# Patient Record
Sex: FEMALE | Race: BLACK OR AFRICAN AMERICAN | NOT HISPANIC OR LATINO
[De-identification: names, ages, dates, MRNs, and addresses within clinical notes are randomized per-mention and may not be internally consistent; named-entity substitution may affect disease eponyms.]

---

## 2019-04-23 ENCOUNTER — APPOINTMENT (OUTPATIENT)
Dept: SURGERY | Facility: CLINIC | Age: 54
End: 2019-04-23
Payer: MEDICAID

## 2019-04-23 VITALS
BODY MASS INDEX: 44.6 KG/M2 | OXYGEN SATURATION: 97 % | HEART RATE: 72 BPM | WEIGHT: 277.5 LBS | HEIGHT: 66 IN | TEMPERATURE: 97.3 F | SYSTOLIC BLOOD PRESSURE: 135 MMHG | DIASTOLIC BLOOD PRESSURE: 79 MMHG

## 2019-04-23 DIAGNOSIS — Z00.00 ENCOUNTER FOR GENERAL ADULT MEDICAL EXAMINATION W/OUT ABNORMAL FINDINGS: ICD-10-CM

## 2019-04-23 PROCEDURE — 99203 OFFICE O/P NEW LOW 30 MIN: CPT

## 2019-04-23 NOTE — PLAN
[FreeTextEntry1] : Pt to begin workup process for bariatric surgery. Pt expresses interest in the VSG

## 2019-04-23 NOTE — HISTORY OF PRESENT ILLNESS
[de-identified] : Pt is a 54 y/o F with pmhx of HLD, HTN, b/l knee pain, who presents today feeling well. Pt states that she has struggled with her weight since quitting smoking in 2011. Pt notes that 2 years ago, she fell at work injuring both knees which has played a role in her weight gain. Reports trying and failing multiple diet and exercise regimens without success. Reports that 11/2018, she had L knee meniscal repair surgery. Pt states she may have a R knee replacement in the future, pending the improvement of her pain with wt loss surgery. Pt is not currently working at this time, pt lives alone at home. Reports b/l ankle swelling. Expresses interest in the VSG, denies reflux. \par

## 2019-04-24 ENCOUNTER — OTHER (OUTPATIENT)
Age: 54
End: 2019-04-24

## 2019-05-13 ENCOUNTER — OTHER (OUTPATIENT)
Age: 54
End: 2019-05-13

## 2019-05-21 ENCOUNTER — APPOINTMENT (OUTPATIENT)
Age: 54
End: 2019-05-21

## 2019-05-21 VITALS — HEIGHT: 66 IN | BODY MASS INDEX: 45.87 KG/M2 | WEIGHT: 285.38 LBS

## 2019-05-22 DIAGNOSIS — Z86.2 PERSONAL HISTORY OF DISEASES OF THE BLOOD AND BLOOD-FORMING ORGANS AND CERTAIN DISORDERS INVOLVING THE IMMUNE MECHANISM: ICD-10-CM

## 2019-05-22 RX ORDER — IRON PS COMPLEX/B12/FOLIC ACID 150-25-1
150-25-1 CAPSULE ORAL
Qty: 30 | Refills: 0 | Status: ACTIVE | COMMUNITY
Start: 2019-05-22 | End: 1900-01-01

## 2019-05-22 RX ORDER — OMEPRAZOLE 20 MG/1
20 CAPSULE, DELAYED RELEASE ORAL DAILY
Qty: 30 | Refills: 1 | Status: ACTIVE | COMMUNITY
Start: 2019-05-22 | End: 1900-01-01

## 2019-05-30 ENCOUNTER — OTHER (OUTPATIENT)
Age: 54
End: 2019-05-30

## 2019-06-19 LAB
25(OH)D3 SERPL-MCNC: 19.5 NG/ML
A-TOCOPHEROL VIT E SERPL-MCNC: 10.6 MG/L
ALBUMIN SERPL ELPH-MCNC: 4 G/DL
ALP BLD-CCNC: 101 U/L
ALT SERPL-CCNC: 15 U/L
ANION GAP SERPL CALC-SCNC: 11 MMOL/L
APPEARANCE: CLEAR
AST SERPL-CCNC: 12 U/L
BACTERIA: NEGATIVE
BASOPHILS # BLD AUTO: 0.06 K/UL
BASOPHILS NFR BLD AUTO: 0.9 %
BETA+GAMMA TOCOPHEROL SERPL-MCNC: 1.9 MG/L
BILIRUB SERPL-MCNC: 0.6 MG/DL
BILIRUBIN URINE: NEGATIVE
BLOOD URINE: NEGATIVE
BUN SERPL-MCNC: 13 MG/DL
CA-I SERPL-SCNC: 1.24 MMOL/L
CALCIUM SERPL-MCNC: 9.4 MG/DL
CALCIUM SERPL-MCNC: 9.4 MG/DL
CHLORIDE SERPL-SCNC: 106 MMOL/L
CHOLEST SERPL-MCNC: 163 MG/DL
CHOLEST/HDLC SERPL: 2.9 RATIO
CO2 SERPL-SCNC: 26 MMOL/L
COLOR: YELLOW
CREAT SERPL-MCNC: 0.7 MG/DL
EOSINOPHIL # BLD AUTO: 0.13 K/UL
EOSINOPHIL NFR BLD AUTO: 2 %
ESTIMATED AVERAGE GLUCOSE: 123 MG/DL
FOLATE SERPL-MCNC: 8 NG/ML
GLUCOSE QUALITATIVE U: NEGATIVE
GLUCOSE SERPL-MCNC: 91 MG/DL
HBA1C MFR BLD HPLC: 5.9 %
HCG SERPL QL: NEGATIVE
HCT VFR BLD CALC: 37.3 %
HDLC SERPL-MCNC: 56 MG/DL
HGB BLD-MCNC: 11.3 G/DL
HYALINE CASTS: 1 /LPF
IMM GRANULOCYTES NFR BLD AUTO: 0.3 %
INR PPP: 1.02 RATIO
IRON SATN MFR SERPL: 10 %
IRON SERPL-MCNC: 30 UG/DL
KETONES URINE: NEGATIVE
LDLC SERPL CALC-MCNC: 90 MG/DL
LEUKOCYTE ESTERASE URINE: NEGATIVE
LYMPHOCYTES # BLD AUTO: 2.02 K/UL
LYMPHOCYTES NFR BLD AUTO: 30.6 %
MAN DIFF?: NORMAL
MCHC RBC-ENTMCNC: 22.4 PG
MCHC RBC-ENTMCNC: 30.3 GM/DL
MCV RBC AUTO: 74 FL
MICROSCOPIC-UA: NORMAL
MONOCYTES # BLD AUTO: 0.59 K/UL
MONOCYTES NFR BLD AUTO: 8.9 %
NEUTROPHILS # BLD AUTO: 3.78 K/UL
NEUTROPHILS NFR BLD AUTO: 57.3 %
NITRITE URINE: NEGATIVE
PAPP-A SERPL-ACNC: 1 MIU/ML
PARATHYROID HORMONE INTACT: 71 PG/ML
PH URINE: 6.5
PLATELET # BLD AUTO: 378 K/UL
POTASSIUM SERPL-SCNC: 4 MMOL/L
PREALB SERPL NEPH-MCNC: 22 MG/DL
PROT SERPL-MCNC: 7.2 G/DL
PROTEIN URINE: NORMAL
PT BLD: 11.5 SEC
RBC # BLD: 5.04 M/UL
RBC # FLD: 17.8 %
RED BLOOD CELLS URINE: 3 /HPF
SODIUM SERPL-SCNC: 143 MMOL/L
SPECIFIC GRAVITY URINE: 1.02
SQUAMOUS EPITHELIAL CELLS: 1 /HPF
TIBC SERPL-MCNC: 296 UG/DL
TRIGL SERPL-MCNC: 86 MG/DL
TSH SERPL-ACNC: 3.97 UIU/ML
UIBC SERPL-MCNC: 266 UG/DL
UREA BREATH TEST QL: POSITIVE
UROBILINOGEN URINE: NORMAL
VIT A SERPL-MCNC: 35.7 UG/DL
VIT B1 SERPL-MCNC: 100.6 NMOL/L
VIT B12 SERPL-MCNC: 371 PG/ML
WBC # FLD AUTO: 6.6 K/UL
WHITE BLOOD CELLS URINE: 1 /HPF
ZINC SERPL-MCNC: 94 UG/DL

## 2019-06-24 ENCOUNTER — APPOINTMENT (OUTPATIENT)
Age: 54
End: 2019-06-24
Payer: MEDICAID

## 2019-06-24 ENCOUNTER — OTHER (OUTPATIENT)
Age: 54
End: 2019-06-24

## 2019-06-24 VITALS — HEIGHT: 66 IN | BODY MASS INDEX: 45.16 KG/M2 | WEIGHT: 281 LBS

## 2019-06-24 PROCEDURE — 97802 MEDICAL NUTRITION INDIV IN: CPT

## 2019-06-26 ENCOUNTER — APPOINTMENT (OUTPATIENT)
Dept: ORTHOPEDIC SURGERY | Facility: CLINIC | Age: 54
End: 2019-06-26
Payer: OTHER MISCELLANEOUS

## 2019-06-26 VITALS — HEIGHT: 66 IN | WEIGHT: 281 LBS | BODY MASS INDEX: 45.16 KG/M2

## 2019-06-26 PROCEDURE — 73562 X-RAY EXAM OF KNEE 3: CPT | Mod: 50

## 2019-06-26 PROCEDURE — 20611 DRAIN/INJ JOINT/BURSA W/US: CPT | Mod: 50

## 2019-06-26 PROCEDURE — 99204 OFFICE O/P NEW MOD 45 MIN: CPT | Mod: 25

## 2019-06-26 NOTE — HISTORY OF PRESENT ILLNESS
[de-identified] : HERE FOR FOLLOW UP AND PERMANENCY REPORT - PATIENT WAS PENDING APPROVAL FOR RIGHT KNEE REPLACEMENT. HAS BEEN CONSULTING WITH DR. WALTON ON BARIATRIC SURGERY AS WELLPatient appeared to move forward with right knee replacement surgery. Both knees are painful but the right is moreAdvanced

## 2019-06-26 NOTE — PROCEDURE
[de-identified] : He was given cortisone injections into the lateral compartment of each knee. This is done sterile conditions an ultrasound guidance. Patient tolerated the procedure well.

## 2019-06-26 NOTE — PHYSICAL EXAM
[de-identified] : Right knee has medial joint line tenderness range of motion 0-110°. Neurovascular intact distally.\par \par Left knee has medial joint line tenderness there is warmth range of motion 0-120°. Neurovascular intact distally. [de-identified] : Patient had AP standing individual lateral and sunrise views obtained of both knees. Both knees have complete obliteration of medial joint space on standing AP projection right is radiographically worse.

## 2019-06-26 NOTE — DISCUSSION/SUMMARY
[de-identified] : Reverse benefits of knee replacement surgery were discussed in detail with the patient she asked appropriate questions are answered to her satisfaction. [Surgical risks reviewed] : Surgical risks reviewed

## 2019-07-22 ENCOUNTER — APPOINTMENT (OUTPATIENT)
Dept: BARIATRICS | Facility: CLINIC | Age: 54
End: 2019-07-22
Payer: MEDICAID

## 2019-07-22 VITALS — WEIGHT: 282.13 LBS | HEIGHT: 66 IN | BODY MASS INDEX: 45.34 KG/M2

## 2019-07-22 PROCEDURE — 97803 MED NUTRITION INDIV SUBSEQ: CPT

## 2019-07-23 ENCOUNTER — OTHER (OUTPATIENT)
Age: 54
End: 2019-07-23

## 2019-08-14 ENCOUNTER — APPOINTMENT (OUTPATIENT)
Dept: ORTHOPEDIC SURGERY | Facility: CLINIC | Age: 54
End: 2019-08-14
Payer: OTHER MISCELLANEOUS

## 2019-08-14 VITALS — BODY MASS INDEX: 45.32 KG/M2 | WEIGHT: 282 LBS | HEIGHT: 66 IN

## 2019-08-14 PROCEDURE — 20611 DRAIN/INJ JOINT/BURSA W/US: CPT | Mod: 50

## 2019-08-14 PROCEDURE — 99214 OFFICE O/P EST MOD 30 MIN: CPT | Mod: 25

## 2019-08-14 NOTE — DISCUSSION/SUMMARY
[Surgical risks reviewed] : Surgical risks reviewed [de-identified] : The resources benefits of knee replacement surgery discussed in detail. Patient asked appropriate questions which were answered to her satisfaction. We will try to accommodate her pending authorization.

## 2019-08-14 NOTE — PHYSICAL EXAM
[de-identified] : Right knee has medial joint line tenderness range of motion 0-110°. Neurovascular intact distally.\par \par Left knee has medial joint line tenderness there is warmth range of motion 0-120°. Neurovascular intact distally.

## 2019-08-14 NOTE — HISTORY OF PRESENT ILLNESS
[de-identified] : SARA KNEE PAIN - WOULD LIKE CORTISONE INJECTIONS. \par \par PENDING RIGHT KNEE REPLACEMENT APPROVAL THROUGH . Patient complains of worsening bilateral knee pain right greater than left. She currently takes anti-inflammatories and utilizes a cane.

## 2019-08-14 NOTE — PROCEDURE
[de-identified] : He was given cortisone injections into the lateral compartment of each knee. This is done sterile conditions an ultrasound guidance. Patient tolerated the procedure well.

## 2019-08-26 ENCOUNTER — APPOINTMENT (OUTPATIENT)
Dept: BARIATRICS | Facility: CLINIC | Age: 54
End: 2019-08-26
Payer: MEDICAID

## 2019-08-26 VITALS — HEIGHT: 66 IN | BODY MASS INDEX: 44.86 KG/M2 | WEIGHT: 279.13 LBS

## 2019-08-26 PROCEDURE — 97803 MED NUTRITION INDIV SUBSEQ: CPT

## 2019-09-24 ENCOUNTER — APPOINTMENT (OUTPATIENT)
Dept: SURGERY | Facility: CLINIC | Age: 54
End: 2019-09-24
Payer: MEDICAID

## 2019-09-24 VITALS
TEMPERATURE: 96.9 F | HEIGHT: 66 IN | OXYGEN SATURATION: 96 % | HEART RATE: 96 BPM | BODY MASS INDEX: 44.54 KG/M2 | SYSTOLIC BLOOD PRESSURE: 123 MMHG | DIASTOLIC BLOOD PRESSURE: 71 MMHG | WEIGHT: 277.13 LBS

## 2019-09-24 DIAGNOSIS — A04.8 OTHER SPECIFIED BACTERIAL INTESTINAL INFECTIONS: ICD-10-CM

## 2019-09-24 PROCEDURE — 99213 OFFICE O/P EST LOW 20 MIN: CPT

## 2019-09-24 NOTE — HISTORY OF PRESENT ILLNESS
[de-identified] : 53 y/o F with PMHx of HLD, HTN, B/L knee pain, presents here today for follow up on her bariatric work up. She states that she has completed most of her work up. She was previously tested positive for H.pylori and has been started on antibiotics for this. She would like a repeat test for this. She is interested in undergoing VSG.

## 2019-09-24 NOTE — PLAN
[FreeTextEntry1] : Pt to continue with work up (Obtain H.Pylori test, UGI, Psych eval., Abdominal US).

## 2019-09-24 NOTE — ADDENDUM
[FreeTextEntry1] : Documented by Phill Redding acting as a scribe for Dr. Connor Lepe on 09/24/2019\par

## 2019-09-24 NOTE — END OF VISIT
[FreeTextEntry3] : All medical record entries made by the Scribe were at my, Dr. Lepe's direction and personally dictated by me on 09/24/2019  I have reviewed the chart and agree that the record accurately reflects my personal performance of the history, physical exam, assessment and plan. I have also personally directed, reviewed, and agreed with the chart.\par \par

## 2019-10-01 LAB — UREA BREATH TEST QL: POSITIVE

## 2019-10-02 ENCOUNTER — FORM ENCOUNTER (OUTPATIENT)
Age: 54
End: 2019-10-02

## 2019-10-03 ENCOUNTER — APPOINTMENT (OUTPATIENT)
Dept: PULMONOLOGY | Facility: CLINIC | Age: 54
End: 2019-10-03
Payer: MEDICAID

## 2019-10-03 ENCOUNTER — OTHER (OUTPATIENT)
Age: 54
End: 2019-10-03

## 2019-10-03 ENCOUNTER — OUTPATIENT (OUTPATIENT)
Dept: OUTPATIENT SERVICES | Facility: HOSPITAL | Age: 54
LOS: 1 days | End: 2019-10-03
Payer: MEDICARE

## 2019-10-03 VITALS
TEMPERATURE: 97.7 F | OXYGEN SATURATION: 94 % | SYSTOLIC BLOOD PRESSURE: 124 MMHG | HEIGHT: 66 IN | BODY MASS INDEX: 45 KG/M2 | DIASTOLIC BLOOD PRESSURE: 80 MMHG | HEART RATE: 71 BPM | WEIGHT: 280 LBS

## 2019-10-03 DIAGNOSIS — Z01.811 ENCOUNTER FOR PREPROCEDURAL RESPIRATORY EXAMINATION: ICD-10-CM

## 2019-10-03 DIAGNOSIS — Z87.891 PERSONAL HISTORY OF NICOTINE DEPENDENCE: ICD-10-CM

## 2019-10-03 DIAGNOSIS — Z82.5 FAMILY HISTORY OF ASTHMA AND OTHER CHRONIC LOWER RESPIRATORY DISEASES: ICD-10-CM

## 2019-10-03 DIAGNOSIS — R40.0 SOMNOLENCE: ICD-10-CM

## 2019-10-03 DIAGNOSIS — Z86.018 PERSONAL HISTORY OF OTHER BENIGN NEOPLASM: ICD-10-CM

## 2019-10-03 DIAGNOSIS — Z81.8 FAMILY HISTORY OF OTHER MENTAL AND BEHAVIORAL DISORDERS: ICD-10-CM

## 2019-10-03 PROCEDURE — 71046 X-RAY EXAM CHEST 2 VIEWS: CPT

## 2019-10-03 PROCEDURE — 94727 GAS DIL/WSHOT DETER LNG VOL: CPT

## 2019-10-03 PROCEDURE — 71046 X-RAY EXAM CHEST 2 VIEWS: CPT | Mod: 26

## 2019-10-03 PROCEDURE — 94729 DIFFUSING CAPACITY: CPT

## 2019-10-03 PROCEDURE — 99204 OFFICE O/P NEW MOD 45 MIN: CPT | Mod: 25

## 2019-10-03 PROCEDURE — 94060 EVALUATION OF WHEEZING: CPT

## 2019-10-03 RX ORDER — LOSARTAN POTASSIUM 25 MG/1
25 TABLET, FILM COATED ORAL DAILY
Qty: 90 | Refills: 3 | Status: ACTIVE | COMMUNITY
Start: 2019-10-03

## 2019-10-03 RX ORDER — FUROSEMIDE 40 MG/1
40 TABLET ORAL DAILY
Qty: 30 | Refills: 5 | Status: ACTIVE | COMMUNITY
Start: 2019-10-03

## 2019-10-03 NOTE — PHYSICAL EXAM
[General Appearance - Well Developed] : well developed [Normal Appearance] : normal appearance [Well Groomed] : well groomed [General Appearance - Well Nourished] : well nourished [No Deformities] : no deformities [General Appearance - In No Acute Distress] : no acute distress [Normal Conjunctiva] : the conjunctiva exhibited no abnormalities [Eyelids - No Xanthelasma] : the eyelids demonstrated no xanthelasmas [Normal Oropharynx] : normal oropharynx [Neck Appearance] : the appearance of the neck was normal [Neck Cervical Mass (___cm)] : no neck mass was observed [Jugular Venous Distention Increased] : there was no jugular-venous distention [Thyroid Diffuse Enlargement] : the thyroid was not enlarged [Thyroid Nodule] : there were no palpable thyroid nodules [Heart Rate And Rhythm] : heart rate and rhythm were normal [Heart Sounds] : normal S1 and S2 [Murmurs] : no murmurs present [Respiration, Rhythm And Depth] : normal respiratory rhythm and effort [Exaggerated Use Of Accessory Muscles For Inspiration] : no accessory muscle use [Auscultation Breath Sounds / Voice Sounds] : lungs were clear to auscultation bilaterally [Nail Clubbing] : no clubbing of the fingernails [Cyanosis, Localized] : no localized cyanosis [Petechial Hemorrhages (___cm)] : no petechial hemorrhages [Skin Color & Pigmentation] : normal skin color and pigmentation [Skin Turgor] : normal skin turgor [] : no rash

## 2019-10-03 NOTE — PROCEDURE
[FreeTextEntry1] : Pulmonary Function Test\par \par FEV/FVC- 77% pre, 80% post\par FEV1- 65% pre, 60% post\par FVC- 69% pre, 61% post\par TLC- 70%\par DLCO 62%\par \par Interpretation: \par \par Spirometry within normal limits, no significant response to to the inhaled bronchodilator.  Mild RLD and mild decrease in DLCO. \par

## 2019-10-03 NOTE — ASSESSMENT
[FreeTextEntry1] : JACQUELINE GIL  is optimized for surgery. she  is to be extubated once fully awake and able to protect airway.  The patient is to be monitored in the recovery room. They might benefit for high flow oxygen or noninvasive ventilation to prevent or reverse atelectasis.  Patient is to be admitted to a monitored bed postoperatively.  Avoid oversedation.  JACQUELINE is high risk for DVT and will require bimodal agents for DVT prophylaxis early mobilization is recommended. she is to use the incentive spirometry postoperative. \par \par JACQUELINE GIL is to have a home sleep study. I discussed sleep apnea in detail with the patient. I explained the benefit of weight reduction surgery for sleep apnea.  Mallampati IV  and neck circumference is 15.5  inch, [place] sleepiness scale is 12 .  I explained sleep apnea might not improve with surgery due to the anatomical features with short thick neck and small mandible.  Pt is to repeat the sleep study 2 months after surg if positive for sleep apnea.\par \par PFT today revealed Spirometry within normal limits, no significant response to to the inhaled bronchodilator.  Mild RLD and mild decrease in DLCO. No need for inhalers at this time\par \par Plan\par - Follow with sleep study\par - Follow with CXR \par \par

## 2019-10-03 NOTE — HISTORY OF PRESENT ILLNESS
[Frequent Nocturnal Awakening] : frequent nocturnal awakening [Recent  Weight Gain] : recent weight gain [Stable] : are stable [Difficulty Breathing During Exertion] : denies dyspnea on exertion [Feelings Of Weakness On Exertion] : denies exercise intolerance [Cough] : denies coughing [Wheezing] : denies wheezing [Regional Soft Tissue Swelling Both Lower Extremities] : denies lower extremity edema [Chest Pain Or Discomfort] : denies chest pain [Fever] : denies fever [Snoring] : no snoring [Witnessed Apneas] : no witnessed sleep apnea [Daytime Somnolence] : no daytime somnolence [Awakening With Dry Mouth] : no dry mouth upon awakening [FreeTextEntry1] : 54 yr old female with PMH HTN, HLD and obesity.  Presents today for pulmonary clearance for bariatric surg. Pt a former smoker quit 2005 and smoked for 15 yrs 5 cig a day. 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 \par \par Denies any coughing, wheezing, acid reflux, post nasal gtt and fevers.  She limitation to to walking with her knee but denies any SOB.  she feels SOB with bending down with the weight gain.  Pt is not sure if she snores at night and does experience daytime fatigue.  \par \par

## 2019-10-23 RX ORDER — FLUCONAZOLE 150 MG/1
150 TABLET ORAL DAILY
Qty: 10 | Refills: 0 | Status: ACTIVE | COMMUNITY
Start: 2019-10-23 | End: 1900-01-01

## 2019-10-24 ENCOUNTER — OUTPATIENT (OUTPATIENT)
Dept: OUTPATIENT SERVICES | Facility: HOSPITAL | Age: 54
LOS: 1 days | End: 2019-10-24
Payer: MEDICARE

## 2019-10-24 ENCOUNTER — APPOINTMENT (OUTPATIENT)
Dept: RADIOLOGY | Facility: HOSPITAL | Age: 54
End: 2019-10-24
Payer: MEDICAID

## 2019-10-24 ENCOUNTER — APPOINTMENT (OUTPATIENT)
Dept: ULTRASOUND IMAGING | Facility: HOSPITAL | Age: 54
End: 2019-10-24
Payer: MEDICAID

## 2019-10-24 PROCEDURE — 76700 US EXAM ABDOM COMPLETE: CPT

## 2019-10-24 PROCEDURE — 74241: CPT | Mod: 26

## 2019-10-24 PROCEDURE — 76700 US EXAM ABDOM COMPLETE: CPT | Mod: 26

## 2019-10-24 PROCEDURE — 74241: CPT

## 2019-11-01 ENCOUNTER — OTHER (OUTPATIENT)
Age: 54
End: 2019-11-01

## 2019-11-01 ENCOUNTER — LABORATORY RESULT (OUTPATIENT)
Age: 54
End: 2019-11-01

## 2019-11-01 ENCOUNTER — APPOINTMENT (OUTPATIENT)
Dept: SURGERY | Facility: CLINIC | Age: 54
End: 2019-11-01
Payer: MEDICAID

## 2019-11-01 VITALS
WEIGHT: 265.5 LBS | HEART RATE: 68 BPM | BODY MASS INDEX: 42.67 KG/M2 | HEIGHT: 66 IN | OXYGEN SATURATION: 99 % | SYSTOLIC BLOOD PRESSURE: 140 MMHG | TEMPERATURE: 97.3 F | DIASTOLIC BLOOD PRESSURE: 84 MMHG

## 2019-11-01 PROCEDURE — 99213 OFFICE O/P EST LOW 20 MIN: CPT

## 2019-11-01 NOTE — ADDENDUM
[FreeTextEntry1] : Documented by Phill Redding acting as a scribe for Dr. Connor Lepe on 11/01/2019\par

## 2019-11-01 NOTE — HISTORY OF PRESENT ILLNESS
[de-identified] : 53 y/o F with Hx of Morbid obesity, HLD, HTN, B/L knee pain, presents here today for follow up on her bariatric work up. She states that she has completed most of her work up. She has been retested for H.pylori today. She is interested in undergoing VSG.

## 2019-11-01 NOTE — END OF VISIT
[FreeTextEntry3] : All medical record entries made by the Scribe were at my, Dr. Lepe's direction and personally dictated by me on 11/01/2019  I have reviewed the chart and agree that the record accurately reflects my personal performance of the history, physical exam, assessment and plan. I have also personally directed, reviewed, and agreed with the chart.\par \par

## 2019-11-01 NOTE — ASSESSMENT
[FreeTextEntry1] : 53 y/o F with Hx of Morbid obesity, HLD, HTN, B/L knee pain, in the middle of work up. Patient is doing well. She just completed her H.pylori retesting today. She has also completed her monthly weigh ins. UGI reviewed shows no signs of GERD and patient does not endorse any symptoms of reflux. Abdominal US reveals patient to have a mildly enlarged liver. Patient has yet to obtain medical and pulmonary clearance. Will obtain these first prior to scheduling.

## 2019-11-04 ENCOUNTER — OTHER (OUTPATIENT)
Age: 54
End: 2019-11-04

## 2019-11-13 ENCOUNTER — APPOINTMENT (OUTPATIENT)
Dept: SLEEP CENTER | Facility: HOME HEALTH | Age: 54
End: 2019-11-13
Payer: MEDICAID

## 2019-11-13 ENCOUNTER — OUTPATIENT (OUTPATIENT)
Dept: OUTPATIENT SERVICES | Facility: HOSPITAL | Age: 54
LOS: 1 days | End: 2019-11-13
Payer: MEDICARE

## 2019-11-13 PROCEDURE — 95800 SLP STDY UNATTENDED: CPT | Mod: 26

## 2019-11-13 PROCEDURE — 95800 SLP STDY UNATTENDED: CPT

## 2019-11-14 DIAGNOSIS — G47.33 OBSTRUCTIVE SLEEP APNEA (ADULT) (PEDIATRIC): ICD-10-CM

## 2019-11-22 ENCOUNTER — APPOINTMENT (OUTPATIENT)
Dept: SURGERY | Facility: CLINIC | Age: 54
End: 2019-11-22
Payer: MEDICAID

## 2019-11-22 VITALS
DIASTOLIC BLOOD PRESSURE: 77 MMHG | HEART RATE: 76 BPM | BODY MASS INDEX: 38.97 KG/M2 | OXYGEN SATURATION: 97 % | WEIGHT: 263.13 LBS | HEIGHT: 69 IN | SYSTOLIC BLOOD PRESSURE: 131 MMHG | TEMPERATURE: 97.6 F

## 2019-11-22 DIAGNOSIS — M17.11 UNILATERAL PRIMARY OSTEOARTHRITIS, RIGHT KNEE: ICD-10-CM

## 2019-11-22 DIAGNOSIS — E78.5 HYPERLIPIDEMIA, UNSPECIFIED: ICD-10-CM

## 2019-11-22 DIAGNOSIS — I10 ESSENTIAL (PRIMARY) HYPERTENSION: ICD-10-CM

## 2019-11-22 PROCEDURE — 99213 OFFICE O/P EST LOW 20 MIN: CPT

## 2019-11-22 NOTE — END OF VISIT
[FreeTextEntry3] : All medical record entries made by the Scribe were at my, Dr. Lepe's direction and personally dictated by me on 11/22/2019  I have reviewed the chart and agree that the record accurately reflects my personal performance of the history, physical exam, assessment and plan. I have also personally directed, reviewed, and agreed with the chart.\par \par

## 2019-11-22 NOTE — ADDENDUM
[FreeTextEntry1] : Documented by Phill Redding acting as a scribe for Dr. Connor Lepe on 11/22/2019\par

## 2019-11-22 NOTE — ASSESSMENT
[FreeTextEntry1] : 55 y/o F with Hx of Morbid obesity, HLD, HTN, B/L knee pain, with complete work up. Review of sleep study shows patient to have mild YAQUELIN. Review of her blood work shows elevated cholesterol levels. HA1c levels are at 5.9 which reveals patient to be PreDM. H.pylori retesting returned negative. Patient has obtained medical clearance from her PCP. She is ready to be scheduled for surgery. She is interested in VSG. The surgical approach, risks, benefits, and alternatives to the proposed procedure were discussed with the patient and all questions were answered to their satisfaction. Pre and Post-operative instructions were provided to the patient. She understands and agrees to undergo the proposed procedure.

## 2019-11-22 NOTE — HISTORY OF PRESENT ILLNESS
[de-identified] : 53 y/o F with Hx of Morbid obesity, HLD, HTN, B/L knee pain, presents here today for a final visit. She reports here feeling well. She states that her HTN has been well controlled with Losartan. She states that she has obtained her clearance from her PCP from .

## 2019-11-25 ENCOUNTER — APPOINTMENT (OUTPATIENT)
Dept: ORTHOPEDIC SURGERY | Facility: CLINIC | Age: 54
End: 2019-11-25
Payer: OTHER MISCELLANEOUS

## 2019-11-25 PROCEDURE — 99214 OFFICE O/P EST MOD 30 MIN: CPT | Mod: 25

## 2019-11-25 PROCEDURE — 20611 DRAIN/INJ JOINT/BURSA W/US: CPT | Mod: RT

## 2019-11-25 NOTE — PHYSICAL EXAM
[de-identified] : Right knee has medial joint line tenderness range of motion 0-110°. Neurovascular intact distally.\par \par Left knee has medial joint line tenderness there is warmth range of motion 0-120°. Neurovascular intact distally.

## 2019-11-25 NOTE — PROCEDURE
[de-identified] : He was given cortisone injections into the lateral compartment of the right  knee. This is done sterile conditions an ultrasound guidance. Patient tolerated the procedure well.

## 2019-11-25 NOTE — HISTORY OF PRESENT ILLNESS
[de-identified] : PATIENT HERE FOR FOLLOW UP ON RIGHT KNEE PAIN - CORTISONE TODAY - PATIENT UNDERGOING BARIATRIC SURGERY 01/15/20 - \par FOLLOWING RECOVERY - KNEE REPLACEMENT SURGERY TO BE SCHEDULED LATER IN 2020

## 2019-12-12 ENCOUNTER — APPOINTMENT (OUTPATIENT)
Dept: BARIATRICS | Facility: CLINIC | Age: 54
End: 2019-12-12

## 2020-01-13 ENCOUNTER — RESULT REVIEW (OUTPATIENT)
Age: 55
End: 2020-01-13

## 2020-01-13 ENCOUNTER — INPATIENT (INPATIENT)
Facility: HOSPITAL | Age: 55
LOS: 1 days | Discharge: HOME | DRG: 621 | End: 2020-01-15
Attending: SURGERY | Admitting: SURGERY
Payer: MEDICARE

## 2020-01-13 ENCOUNTER — APPOINTMENT (OUTPATIENT)
Dept: SURGERY | Facility: HOSPITAL | Age: 55
End: 2020-01-13

## 2020-01-13 VITALS
WEIGHT: 270.51 LBS | TEMPERATURE: 98 F | OXYGEN SATURATION: 96 % | RESPIRATION RATE: 18 BRPM | DIASTOLIC BLOOD PRESSURE: 82 MMHG | HEIGHT: 66 IN | SYSTOLIC BLOOD PRESSURE: 137 MMHG | HEART RATE: 90 BPM

## 2020-01-13 DIAGNOSIS — D25.1 INTRAMURAL LEIOMYOMA OF UTERUS: Chronic | ICD-10-CM

## 2020-01-13 DIAGNOSIS — Z96.652 PRESENCE OF LEFT ARTIFICIAL KNEE JOINT: Chronic | ICD-10-CM

## 2020-01-13 LAB
BLD GP AB SCN SERPL QL: NEGATIVE — SIGNIFICANT CHANGE UP
HCT VFR BLD CALC: 37 % — SIGNIFICANT CHANGE UP (ref 34.5–45)
HGB BLD-MCNC: 11.1 G/DL — LOW (ref 11.5–15.5)
MCHC RBC-ENTMCNC: 22.7 PG — LOW (ref 27–34)
MCHC RBC-ENTMCNC: 30 GM/DL — LOW (ref 32–36)
MCV RBC AUTO: 75.5 FL — LOW (ref 80–100)
NRBC # BLD: 0 /100 WBCS — SIGNIFICANT CHANGE UP (ref 0–0)
PLATELET # BLD AUTO: 326 K/UL — SIGNIFICANT CHANGE UP (ref 150–400)
RBC # BLD: 4.9 M/UL — SIGNIFICANT CHANGE UP (ref 3.8–5.2)
RBC # FLD: 16.8 % — HIGH (ref 10.3–14.5)
RH IG SCN BLD-IMP: POSITIVE — SIGNIFICANT CHANGE UP
WBC # BLD: 10.56 K/UL — HIGH (ref 3.8–10.5)
WBC # FLD AUTO: 10.56 K/UL — HIGH (ref 3.8–10.5)

## 2020-01-13 PROCEDURE — 43775 LAP SLEEVE GASTRECTOMY: CPT | Mod: GC

## 2020-01-13 PROCEDURE — 88360 TUMOR IMMUNOHISTOCHEM/MANUAL: CPT | Mod: 26

## 2020-01-13 PROCEDURE — 88307 TISSUE EXAM BY PATHOLOGIST: CPT | Mod: 26

## 2020-01-13 PROCEDURE — 88341 IMHCHEM/IMCYTCHM EA ADD ANTB: CPT | Mod: 26,59

## 2020-01-13 PROCEDURE — 88342 IMHCHEM/IMCYTCHM 1ST ANTB: CPT | Mod: 26,59

## 2020-01-13 RX ORDER — HYDROMORPHONE HYDROCHLORIDE 2 MG/ML
0.5 INJECTION INTRAMUSCULAR; INTRAVENOUS; SUBCUTANEOUS EVERY 4 HOURS
Refills: 0 | Status: DISCONTINUED | OUTPATIENT
Start: 2020-01-13 | End: 2020-01-14

## 2020-01-13 RX ORDER — KETOROLAC TROMETHAMINE 30 MG/ML
30 SYRINGE (ML) INJECTION EVERY 6 HOURS
Refills: 0 | Status: DISCONTINUED | OUTPATIENT
Start: 2020-01-13 | End: 2020-01-15

## 2020-01-13 RX ORDER — ENOXAPARIN SODIUM 100 MG/ML
40 INJECTION SUBCUTANEOUS EVERY 12 HOURS
Refills: 0 | Status: DISCONTINUED | OUTPATIENT
Start: 2020-01-13 | End: 2020-01-15

## 2020-01-13 RX ORDER — SCOPALAMINE 1 MG/3D
1 PATCH, EXTENDED RELEASE TRANSDERMAL ONCE
Refills: 0 | Status: COMPLETED | OUTPATIENT
Start: 2020-01-13 | End: 2020-01-13

## 2020-01-13 RX ORDER — PANTOPRAZOLE SODIUM 20 MG/1
40 TABLET, DELAYED RELEASE ORAL DAILY
Refills: 0 | Status: DISCONTINUED | OUTPATIENT
Start: 2020-01-13 | End: 2020-01-15

## 2020-01-13 RX ORDER — ONDANSETRON 8 MG/1
4 TABLET, FILM COATED ORAL EVERY 6 HOURS
Refills: 0 | Status: DISCONTINUED | OUTPATIENT
Start: 2020-01-13 | End: 2020-01-15

## 2020-01-13 RX ORDER — ENOXAPARIN SODIUM 100 MG/ML
40 INJECTION SUBCUTANEOUS ONCE
Refills: 0 | Status: COMPLETED | OUTPATIENT
Start: 2020-01-13 | End: 2020-01-13

## 2020-01-13 RX ORDER — FUROSEMIDE 40 MG
40 TABLET ORAL DAILY
Refills: 0 | Status: DISCONTINUED | OUTPATIENT
Start: 2020-01-14 | End: 2020-01-14

## 2020-01-13 RX ORDER — SODIUM CHLORIDE 9 MG/ML
1000 INJECTION, SOLUTION INTRAVENOUS
Refills: 0 | Status: DISCONTINUED | OUTPATIENT
Start: 2020-01-13 | End: 2020-01-14

## 2020-01-13 RX ORDER — ACETAMINOPHEN 500 MG
1000 TABLET ORAL ONCE
Refills: 0 | Status: COMPLETED | OUTPATIENT
Start: 2020-01-13 | End: 2020-01-13

## 2020-01-13 RX ORDER — GABAPENTIN 400 MG/1
300 CAPSULE ORAL ONCE
Refills: 0 | Status: COMPLETED | OUTPATIENT
Start: 2020-01-13 | End: 2020-01-13

## 2020-01-13 RX ADMIN — ENOXAPARIN SODIUM 40 MILLIGRAM(S): 100 INJECTION SUBCUTANEOUS at 11:21

## 2020-01-13 RX ADMIN — HYDROMORPHONE HYDROCHLORIDE 0.5 MILLIGRAM(S): 2 INJECTION INTRAMUSCULAR; INTRAVENOUS; SUBCUTANEOUS at 21:45

## 2020-01-13 RX ADMIN — Medication 400 MILLIGRAM(S): at 19:41

## 2020-01-13 RX ADMIN — PANTOPRAZOLE SODIUM 40 MILLIGRAM(S): 20 TABLET, DELAYED RELEASE ORAL at 21:28

## 2020-01-13 RX ADMIN — GABAPENTIN 300 MILLIGRAM(S): 400 CAPSULE ORAL at 11:21

## 2020-01-13 RX ADMIN — ENOXAPARIN SODIUM 40 MILLIGRAM(S): 100 INJECTION SUBCUTANEOUS at 21:28

## 2020-01-13 RX ADMIN — Medication 1000 MILLIGRAM(S): at 11:22

## 2020-01-13 RX ADMIN — SCOPALAMINE 1 PATCH: 1 PATCH, EXTENDED RELEASE TRANSDERMAL at 11:21

## 2020-01-13 RX ADMIN — Medication 30 MILLIGRAM(S): at 23:09

## 2020-01-13 RX ADMIN — HYDROMORPHONE HYDROCHLORIDE 0.5 MILLIGRAM(S): 2 INJECTION INTRAMUSCULAR; INTRAVENOUS; SUBCUTANEOUS at 21:28

## 2020-01-13 RX ADMIN — Medication 30 MILLIGRAM(S): at 23:30

## 2020-01-13 RX ADMIN — Medication 1000 MILLIGRAM(S): at 19:56

## 2020-01-13 NOTE — H&P ADULT - NSICDXPASTMEDICALHX_GEN_ALL_CORE_FT
PAST MEDICAL HISTORY:  Eczema     Hypercholesteremia     Hypertension     Iron deficiency     Osteoarthritis     Vitamin D deficiency

## 2020-01-13 NOTE — H&P ADULT - ASSESSMENT
54F with morbid obesity presents electively for laparoscopic sleeve gastrectomy.  - Admit to surgery, regional bed  - OR for above procedure

## 2020-01-13 NOTE — ASU PATIENT PROFILE, ADULT - PMH
Eczema    Hypercholesteremia    Hypertension    Iron deficiency    Osteoarthritis    Vitamin D deficiency

## 2020-01-13 NOTE — PROGRESS NOTE ADULT - SUBJECTIVE AND OBJECTIVE BOX
POST-OPERATIVE NOTE    Procedure: Laparoscopic sleeve gastrectomy     Diagnosis/Indication: morbid obesity    Surgeon: Dr. Lepe    S: Pt has no complaints. Denies CP, SOB, LUIS, calf tenderness. Pain controlled with medication. Denies nausea, vomiting.    O:  T(C): 36.6 (01-13-20 @ 17:40), Max: 36.6 (01-13-20 @ 17:40)  T(F): 97.9 (01-13-20 @ 17:40), Max: 97.9 (01-13-20 @ 17:40)  HR: 63 (01-13-20 @ 18:40) (61 - 65)  BP: 140/70 (01-13-20 @ 17:55) (140/70 - 145/67)  RR: 16 (01-13-20 @ 18:40) (16 - 18)  SpO2: 98% (01-13-20 @ 18:40) (97% - 98%)  Wt(kg): --    Gen: NAD, resting comfortably in bed  C/V: NSR  Pulm: Nonlabored breathing, no respiratory distress  Abd: soft, NT/ND, incision areas are clean, dry and intact  Extrem: WWP, no calf edema or tenderness, SCDs in place    A/P: 54y Female s/p above procedure  Diet: NPO  IVF: LR @ 150cc/hr  Pain/nausea control  Lovenox/SCDs/OOBA/IS  Dispo pending pain control, PO tolerance, clinical improvement

## 2020-01-13 NOTE — H&P ADULT - HISTORY OF PRESENT ILLNESS
Patient is a 54 year old female with morbid obesity (pre-op BMI of 43.6) who has failed attempts at weight loss and now presents electively for a laparoscopic sleeve gastrectomy. She also has HTN and HLD. Pre-op UGI negative for reflux or hiatal hernia. No complaints at present.

## 2020-01-14 LAB
ANION GAP SERPL CALC-SCNC: 12 MMOL/L — SIGNIFICANT CHANGE UP (ref 5–17)
BUN SERPL-MCNC: 9 MG/DL — SIGNIFICANT CHANGE UP (ref 7–23)
CALCIUM SERPL-MCNC: 9.2 MG/DL — SIGNIFICANT CHANGE UP (ref 8.4–10.5)
CHLORIDE SERPL-SCNC: 105 MMOL/L — SIGNIFICANT CHANGE UP (ref 96–108)
CO2 SERPL-SCNC: 22 MMOL/L — SIGNIFICANT CHANGE UP (ref 22–31)
CREAT SERPL-MCNC: 0.67 MG/DL — SIGNIFICANT CHANGE UP (ref 0.5–1.3)
GLUCOSE SERPL-MCNC: 122 MG/DL — HIGH (ref 70–99)
HCT VFR BLD CALC: 35.5 % — SIGNIFICANT CHANGE UP (ref 34.5–45)
HCV AB S/CO SERPL IA: 0.11 S/CO — SIGNIFICANT CHANGE UP
HCV AB SERPL-IMP: SIGNIFICANT CHANGE UP
HGB BLD-MCNC: 10.9 G/DL — LOW (ref 11.5–15.5)
MAGNESIUM SERPL-MCNC: 2 MG/DL — SIGNIFICANT CHANGE UP (ref 1.6–2.6)
MCHC RBC-ENTMCNC: 22.9 PG — LOW (ref 27–34)
MCHC RBC-ENTMCNC: 30.7 GM/DL — LOW (ref 32–36)
MCV RBC AUTO: 74.4 FL — LOW (ref 80–100)
NRBC # BLD: 0 /100 WBCS — SIGNIFICANT CHANGE UP (ref 0–0)
PHOSPHATE SERPL-MCNC: 3.9 MG/DL — SIGNIFICANT CHANGE UP (ref 2.5–4.5)
PLATELET # BLD AUTO: 320 K/UL — SIGNIFICANT CHANGE UP (ref 150–400)
POTASSIUM SERPL-MCNC: 4.5 MMOL/L — SIGNIFICANT CHANGE UP (ref 3.5–5.3)
POTASSIUM SERPL-SCNC: 4.5 MMOL/L — SIGNIFICANT CHANGE UP (ref 3.5–5.3)
RBC # BLD: 4.77 M/UL — SIGNIFICANT CHANGE UP (ref 3.8–5.2)
RBC # FLD: 17.2 % — HIGH (ref 10.3–14.5)
SODIUM SERPL-SCNC: 139 MMOL/L — SIGNIFICANT CHANGE UP (ref 135–145)
WBC # BLD: 7.18 K/UL — SIGNIFICANT CHANGE UP (ref 3.8–10.5)
WBC # FLD AUTO: 7.18 K/UL — SIGNIFICANT CHANGE UP (ref 3.8–10.5)

## 2020-01-14 RX ORDER — SODIUM CHLORIDE 9 MG/ML
1000 INJECTION, SOLUTION INTRAVENOUS
Refills: 0 | Status: DISCONTINUED | OUTPATIENT
Start: 2020-01-14 | End: 2020-01-15

## 2020-01-14 RX ORDER — ACETAMINOPHEN 500 MG
650 TABLET ORAL EVERY 6 HOURS
Refills: 0 | Status: DISCONTINUED | OUTPATIENT
Start: 2020-01-14 | End: 2020-01-15

## 2020-01-14 RX ORDER — ACETAMINOPHEN 500 MG
1000 TABLET ORAL ONCE
Refills: 0 | Status: COMPLETED | OUTPATIENT
Start: 2020-01-14 | End: 2020-01-14

## 2020-01-14 RX ADMIN — Medication 30 MILLIGRAM(S): at 05:13

## 2020-01-14 RX ADMIN — HYDROMORPHONE HYDROCHLORIDE 0.5 MILLIGRAM(S): 2 INJECTION INTRAMUSCULAR; INTRAVENOUS; SUBCUTANEOUS at 01:50

## 2020-01-14 RX ADMIN — Medication 1000 MILLIGRAM(S): at 06:10

## 2020-01-14 RX ADMIN — Medication 400 MILLIGRAM(S): at 05:49

## 2020-01-14 RX ADMIN — Medication 40 MILLIGRAM(S): at 05:13

## 2020-01-14 RX ADMIN — SCOPALAMINE 1 PATCH: 1 PATCH, EXTENDED RELEASE TRANSDERMAL at 18:12

## 2020-01-14 RX ADMIN — Medication 30 MILLIGRAM(S): at 18:15

## 2020-01-14 RX ADMIN — Medication 650 MILLIGRAM(S): at 12:45

## 2020-01-14 RX ADMIN — ENOXAPARIN SODIUM 40 MILLIGRAM(S): 100 INJECTION SUBCUTANEOUS at 10:00

## 2020-01-14 RX ADMIN — Medication 30 MILLIGRAM(S): at 23:00

## 2020-01-14 RX ADMIN — Medication 650 MILLIGRAM(S): at 12:11

## 2020-01-14 RX ADMIN — PANTOPRAZOLE SODIUM 40 MILLIGRAM(S): 20 TABLET, DELAYED RELEASE ORAL at 12:11

## 2020-01-14 RX ADMIN — HYDROMORPHONE HYDROCHLORIDE 0.5 MILLIGRAM(S): 2 INJECTION INTRAMUSCULAR; INTRAVENOUS; SUBCUTANEOUS at 01:38

## 2020-01-14 RX ADMIN — SCOPALAMINE 1 PATCH: 1 PATCH, EXTENDED RELEASE TRANSDERMAL at 06:16

## 2020-01-14 RX ADMIN — SODIUM CHLORIDE 150 MILLILITER(S): 9 INJECTION, SOLUTION INTRAVENOUS at 01:43

## 2020-01-14 RX ADMIN — Medication 30 MILLIGRAM(S): at 12:11

## 2020-01-14 RX ADMIN — Medication 30 MILLIGRAM(S): at 05:49

## 2020-01-14 RX ADMIN — Medication 30 MILLIGRAM(S): at 12:45

## 2020-01-14 RX ADMIN — ENOXAPARIN SODIUM 40 MILLIGRAM(S): 100 INJECTION SUBCUTANEOUS at 22:14

## 2020-01-14 NOTE — DIETITIAN INITIAL EVALUATION ADULT. - ENERGY NEEDS
Height: 66" Weight: 220.5lbs, IBW 130lbs+/-10%, %%, BMI 43.7kg/m2  Above energy needs calculated for wt maintenance (20-25kcal/kg).   Weeks 1-2 estimated needs: 590-708kcal/day (10-12kcal/kg), 71-89g pro/day (1.2-1.5g/kg), >/=64oz clear fluids.

## 2020-01-14 NOTE — PROGRESS NOTE ADULT - SUBJECTIVE AND OBJECTIVE BOX
OVERNIGHT EVENTS: pain controlled, encouraged pt to ambulate and use IS, post op h/h: 11.1/37.0, passed TOV (350cc)   1/13: s/p Lap Sleeve Gastrectomy, POC wnl     STATUS POST:  Robotic assisted sleeve gastrectomy     POST OPERATIVE DAY #: 1    SUBJECTIVE:  Patient examined bedside with chief resident. Patient complains of incisional pain and nausea. Patient tolerating minimal PO intake. Patient denies emesis, SOB and chest pain. Patient making adequate urine output.      enoxaparin Injectable 40 milliGRAM(s) SubCutaneous every 12 hours      Vital Signs Last 24 Hrs  T(C): 36.9 (14 Jan 2020 06:27), Max: 37 (14 Jan 2020 00:23)  T(F): 98.4 (14 Jan 2020 06:27), Max: 98.6 (14 Jan 2020 00:23)  HR: 53 (14 Jan 2020 06:27) (53 - 87)  BP: 146/77 (14 Jan 2020 06:27) (132/60 - 148/76)  BP(mean): 94 (13 Jan 2020 20:38) (90 - 97)  RR: 17 (14 Jan 2020 06:27) (16 - 20)  SpO2: 96% (14 Jan 2020 06:27) (93% - 99%)  I&O's Detail    13 Jan 2020 07:01  -  14 Jan 2020 07:00  --------------------------------------------------------  IN:    IV PiggyBack: 100 mL    lactated ringers.: 2200 mL  Total IN: 2300 mL    OUT:    Voided: 650 mL  Total OUT: 650 mL    Total NET: 1650 mL      14 Jan 2020 07:01  -  14 Jan 2020 11:14  --------------------------------------------------------  IN:    Oral Fluid: 360 mL  Total IN: 360 mL    OUT:    Voided: 500 mL  Total OUT: 500 mL    Total NET: -140 mL          General: NAD, resting comfortably in bed  C/V: NSR  Pulm: Nonlabored breathing, no respiratory distress  Abd: Soft, non-distended, TTP around incision site, incision clean dry and intact  Extrem: WWP, no edema, SCDs in place        LABS:                        10.9   7.18  )-----------( 320      ( 14 Jan 2020 06:57 )             35.5     01-14    139  |  105  |  9   ----------------------------<  122<H>  4.5   |  22  |  0.67    Ca    9.2      14 Jan 2020 06:57  Phos  3.9     01-14  Mg     2.0     01-14

## 2020-01-14 NOTE — DIETITIAN INITIAL EVALUATION ADULT. - OTHER INFO
54F with hx of morbid obesity admitting for elective lap sleeve gastrectomy (1/13), now POD #1. On assessment, pt resting in bed. Currently on BARICLLIQ diet, tolerating sips of water. Pt stating diet just advanced and has been sipping on water over the last 30 minutes. Pt had consumed 2oz over the last 30-45 minutes . Pain and nausea well controlled. Discussed volumes of various cup sizes on tray table and encouraged aiming for 4 oz/hr as tolerated. Prepared with protein shakes and vitamins for after discharge. RD provided indepth edu on diet advancement and specific nutrient needs s/p LSG. NKFA. No dietary restrictions at home. Skin: surgical incisions. GI: WDL per flowsheet. RD to follow up per protocol.

## 2020-01-14 NOTE — PROGRESS NOTE ADULT - ASSESSMENT
54 year old female with morbid obesity (pre-op BMI of 43.6) who has failed attempts at weight loss and now s/p electively for a laparoscopic sleeve gastrectomy    Pain/nausea control  BCLD, IVF  Protonix  Lovenox DVT ppx\  SCDs, ALVIN, IS  AM Labs  Nutritional consult in AM

## 2020-01-15 ENCOUNTER — TRANSCRIPTION ENCOUNTER (OUTPATIENT)
Age: 55
End: 2020-01-15

## 2020-01-15 VITALS
DIASTOLIC BLOOD PRESSURE: 63 MMHG | HEART RATE: 61 BPM | OXYGEN SATURATION: 98 % | TEMPERATURE: 98 F | SYSTOLIC BLOOD PRESSURE: 151 MMHG | RESPIRATION RATE: 18 BRPM

## 2020-01-15 LAB
ANION GAP SERPL CALC-SCNC: 12 MMOL/L — SIGNIFICANT CHANGE UP (ref 5–17)
BUN SERPL-MCNC: 6 MG/DL — LOW (ref 7–23)
CALCIUM SERPL-MCNC: 8.6 MG/DL — SIGNIFICANT CHANGE UP (ref 8.4–10.5)
CHLORIDE SERPL-SCNC: 105 MMOL/L — SIGNIFICANT CHANGE UP (ref 96–108)
CO2 SERPL-SCNC: 25 MMOL/L — SIGNIFICANT CHANGE UP (ref 22–31)
CREAT SERPL-MCNC: 0.73 MG/DL — SIGNIFICANT CHANGE UP (ref 0.5–1.3)
GLUCOSE SERPL-MCNC: 91 MG/DL — SIGNIFICANT CHANGE UP (ref 70–99)
HCT VFR BLD CALC: 33.9 % — LOW (ref 34.5–45)
HGB BLD-MCNC: 10.6 G/DL — LOW (ref 11.5–15.5)
MAGNESIUM SERPL-MCNC: 1.9 MG/DL — SIGNIFICANT CHANGE UP (ref 1.6–2.6)
MCHC RBC-ENTMCNC: 23.1 PG — LOW (ref 27–34)
MCHC RBC-ENTMCNC: 31.3 GM/DL — LOW (ref 32–36)
MCV RBC AUTO: 73.9 FL — LOW (ref 80–100)
NRBC # BLD: 0 /100 WBCS — SIGNIFICANT CHANGE UP (ref 0–0)
PHOSPHATE SERPL-MCNC: 2.9 MG/DL — SIGNIFICANT CHANGE UP (ref 2.5–4.5)
PLATELET # BLD AUTO: 283 K/UL — SIGNIFICANT CHANGE UP (ref 150–400)
POTASSIUM SERPL-MCNC: 3.4 MMOL/L — LOW (ref 3.5–5.3)
POTASSIUM SERPL-SCNC: 3.4 MMOL/L — LOW (ref 3.5–5.3)
RBC # BLD: 4.59 M/UL — SIGNIFICANT CHANGE UP (ref 3.8–5.2)
RBC # FLD: 17 % — HIGH (ref 10.3–14.5)
SODIUM SERPL-SCNC: 142 MMOL/L — SIGNIFICANT CHANGE UP (ref 135–145)
WBC # BLD: 8.76 K/UL — SIGNIFICANT CHANGE UP (ref 3.8–10.5)
WBC # FLD AUTO: 8.76 K/UL — SIGNIFICANT CHANGE UP (ref 3.8–10.5)

## 2020-01-15 RX ORDER — FUROSEMIDE 40 MG
1 TABLET ORAL
Qty: 0 | Refills: 0 | DISCHARGE

## 2020-01-15 RX ORDER — ACETAMINOPHEN 500 MG
2 TABLET ORAL
Qty: 32 | Refills: 0
Start: 2020-01-15 | End: 2020-01-18

## 2020-01-15 RX ORDER — PANTOPRAZOLE SODIUM 20 MG/1
1 TABLET, DELAYED RELEASE ORAL
Qty: 30 | Refills: 0
Start: 2020-01-15 | End: 2020-02-13

## 2020-01-15 RX ADMIN — Medication 30 MILLIGRAM(S): at 06:14

## 2020-01-15 RX ADMIN — PANTOPRAZOLE SODIUM 40 MILLIGRAM(S): 20 TABLET, DELAYED RELEASE ORAL at 11:35

## 2020-01-15 RX ADMIN — Medication 30 MILLIGRAM(S): at 12:15

## 2020-01-15 RX ADMIN — SCOPALAMINE 1 PATCH: 1 PATCH, EXTENDED RELEASE TRANSDERMAL at 07:38

## 2020-01-15 RX ADMIN — Medication 30 MILLIGRAM(S): at 11:36

## 2020-01-15 RX ADMIN — ENOXAPARIN SODIUM 40 MILLIGRAM(S): 100 INJECTION SUBCUTANEOUS at 10:06

## 2020-01-15 RX ADMIN — Medication 30 MILLIGRAM(S): at 00:35

## 2020-01-15 RX ADMIN — Medication 30 MILLIGRAM(S): at 05:15

## 2020-01-15 NOTE — DISCHARGE NOTE NURSING/CASE MANAGEMENT/SOCIAL WORK - PATIENT PORTAL LINK FT
You can access the FollowMyHealth Patient Portal offered by [facility] by registering at the following website: [email or web address]. By joining [facility]’s FollowMyHealth portal, you will also be able to view your health information using other applications (apps) compatible with our system.

## 2020-01-15 NOTE — DISCHARGE NOTE PROVIDER - NSDCMRMEDTOKEN_GEN_ALL_CORE_FT
atorvastatin 20 mg oral tablet: 1 tab(s) orally once a day  furosemide 40 mg oral tablet: 1 tab(s) orally once a day  gabapentin 400 mg oral capsule: 1 cap(s) orally 3 times a day  levocetirizine 5 mg oral tablet: 1 tab(s) orally once a day (in the evening)  losartan 50 mg oral tablet: 1 tab(s) orally once a day  meloxicam 15 mg oral tablet: 1 tab(s) orally once a day  mupirocin 2% topical ointment: Apply topically to affected area 3 times a day  olopatadine 0.2% ophthalmic solution: 1 drop(s) to each affected eye once a day  Triamcinolone Acetonide in Absorbase 0.05% topical ointment: Apply topically to affected area 2 times a day atorvastatin 20 mg oral tablet: 1 tab(s) orally once a day  gabapentin 400 mg oral capsule: 1 cap(s) orally 3 times a day  levocetirizine 5 mg oral tablet: 1 tab(s) orally once a day (in the evening)  losartan 50 mg oral tablet: 1 tab(s) orally once a day  meloxicam 15 mg oral tablet: 1 tab(s) orally once a day  mupirocin 2% topical ointment: Apply topically to affected area 3 times a day  olopatadine 0.2% ophthalmic solution: 1 drop(s) to each affected eye once a day  Protonix 40 mg oral delayed release tablet: 1 tab(s) orally once a day MDD:1 tablet  Triamcinolone Acetonide in Absorbase 0.05% topical ointment: Apply topically to affected area 2 times a day  Tylenol 325 mg oral tablet: 2 tab(s) orally every 6 hours, As Needed -for moderate pain - for severe pain MDD:8 tablets

## 2020-01-15 NOTE — PROGRESS NOTE ADULT - ASSESSMENT
54 year old female with morbid obesity (pre-op BMI of 43.6) who has failed attempts at weight loss and now s/p electively for a laparoscopic sleeve gastrectomy    Pain/nausea control  BCLD, IVF  Protonix  Lovenox DVT ppx\  SCDs, OMEDINA, IS  AM Labs

## 2020-01-15 NOTE — PROGRESS NOTE ADULT - SUBJECTIVE AND OBJECTIVE BOX
OVERNIGHT EVENTS: drinking >4oz/hr, tolerating, good urine o/p, OOBA, VSS   1/14:  started on BCLD ,tolerating  , pain well controlled , ambulating , VSS     STATUS POST:  Laparoscopic sleeve gastrectomy    POST OPERATIVE DAY #: 2    SUBJECTIVE: Patient examined bedside with chief resident. Patient reports pain improved and tolerating diet. Patient denies nausea, vomiting, chest pain and SOB. Patient making adequate urine output.        enoxaparin Injectable 40 milliGRAM(s) SubCutaneous every 12 hours      Vital Signs Last 24 Hrs  T(C): 36.8 (15 Juanjo 2020 05:43), Max: 37.2 (14 Jan 2020 12:11)  T(F): 98.3 (15 Juanjo 2020 05:43), Max: 99 (14 Jan 2020 12:11)  HR: 58 (15 Juanjo 2020 05:43) (51 - 60)  BP: 150/69 (15 Juanjo 2020 05:43) (148/72 - 161/69)  BP(mean): --  RR: 18 (15 Juanjo 2020 05:43) (17 - 18)  SpO2: 96% (15 Juanjo 2020 05:43) (95% - 96%)  I&O's Detail    14 Jan 2020 07:01  -  15 Juanjo 2020 07:00  --------------------------------------------------------  IN:    Oral Fluid: 1060 mL  Total IN: 1060 mL    OUT:    Voided: 2100 mL  Total OUT: 2100 mL    Total NET: -1040 mL          General: NAD, resting comfortably in bed  C/V: NSR  Pulm: Nonlabored breathing, no respiratory distress  Abd: Soft, non-distended, TTP around incision site, incision clean dry and intact  Extrem: WWP, no edema, SCDs in place        LABS:                        10.6   8.76  )-----------( 283      ( 15 Juanjo 2020 06:22 )             33.9     01-14    139  |  105  |  9   ----------------------------<  122<H>  4.5   |  22  |  0.67    Ca    9.2      14 Jan 2020 06:57  Phos  3.9     01-14  Mg     2.0     01-14

## 2020-01-15 NOTE — DISCHARGE NOTE PROVIDER - CARE PROVIDER_API CALL
Connor Lepe)  Surgery  [address] [place] [address]  Phone: [phone]  Fax: [phone]  Follow Up Time: 1-3 days

## 2020-01-15 NOTE — DISCHARGE NOTE PROVIDER - NSDCCPCAREPLAN_GEN_ALL_CORE_FT
PRINCIPAL DISCHARGE DIAGNOSIS  Diagnosis: Morbid obesity due to excess calories  Assessment and Plan of Treatment: 1)  Please take Tylenol 650 mg every 4 to 6 hours by mouth for moderate pain control.   2) Please take Percocet 1 to 2 tabs by mouth every 4 to 6 hours  3) Please take Protonix 40 mg once a day by mouth. PRINCIPAL DISCHARGE DIAGNOSIS  Diagnosis: Morbid obesity due to excess calories  Assessment and Plan of Treatment: 1)	Please take Tylenol 650 mg every 6 hours as needed by mouth for moderate to severe pain control.  2)	Please take Protonix 40 mg tab once a day for acid reflux.

## 2020-01-15 NOTE — DISCHARGE NOTE PROVIDER - NSDCFUADDINST_GEN_ALL_CORE_FT
1) Please take Tylenol 650 mg every 6 hours as needed by mouth for moderate to severe pain control.  2) Please take Protonix 40 mg tab once a day for acid reflux.  Follow up with  in 1 week. Call the office at  to schedule your appointment. You may shower; soap and water over incision sites. Do not scrub. Pat dry when done. No tub bathing or swimming until cleared. Keep incision sites out of the sun as scars will darken. No heavy lifting (>10lbs) or strenuous exercise. Diet: Bariatric Full Fluids. 60 grams protein daily.  64 fluid ounces water daily. Drink small sips throughout the day. Continue diet as outlined by paperwork received as a pre-operative patient. You should be urinating at least 3-4x per day. Call the office if you experience increasing abdominal pain, nausea, vomiting, or temperature >100.4F.  NO ASPIRIN OR NSAIDs until approved by Dr. Lepe. Avoid alcoholic beverages until cleared by Dr. Lepe.

## 2020-01-15 NOTE — DISCHARGE NOTE PROVIDER - HOSPITAL COURSE
54 year old female with morbid obesity (pre-op BMI of 43.6) who has failed attempts at weight loss and now s/p electively for a laparoscopic sleeve gastrectomy. Pt tolerated the procedure well. At time of discharge, pt was tolerating a bariatric clear liquid diet, and pt's pain was controlled. Plan is to follow up with Dr. Lepe in the office.

## 2020-01-15 NOTE — DISCHARGE NOTE PROVIDER - NSDCCPGOAL_GEN_ALL_CORE_FT
To get better and follow your care plan as instructed.   1)  Please take Tylenol 650 mg every 4 to 6 hours by mouth for moderate pain control.   2) Please take Percocet 1 to 2 tabs by mouth every 4 to 6 hours  3) Please take Protonix 40 mg once a day by mouth. To get better and follow your care plan as instructed.   1)	Please take Tylenol 650 mg every 6 hours as needed by mouth for moderate to severe pain control.  2)	Please take Protonix 40 mg tab once a day for acid reflux.

## 2020-01-17 PROBLEM — L30.9 DERMATITIS, UNSPECIFIED: Chronic | Status: ACTIVE | Noted: 2020-01-13

## 2020-01-17 PROBLEM — M19.90 UNSPECIFIED OSTEOARTHRITIS, UNSPECIFIED SITE: Chronic | Status: ACTIVE | Noted: 2020-01-13

## 2020-01-17 PROBLEM — E78.00 PURE HYPERCHOLESTEROLEMIA, UNSPECIFIED: Chronic | Status: ACTIVE | Noted: 2020-01-13

## 2020-01-17 PROBLEM — I10 ESSENTIAL (PRIMARY) HYPERTENSION: Chronic | Status: ACTIVE | Noted: 2020-01-13

## 2020-01-17 PROBLEM — E61.1 IRON DEFICIENCY: Chronic | Status: ACTIVE | Noted: 2020-01-13

## 2020-01-17 PROBLEM — E55.9 VITAMIN D DEFICIENCY, UNSPECIFIED: Chronic | Status: ACTIVE | Noted: 2020-01-13

## 2020-01-20 DIAGNOSIS — E55.9 VITAMIN D DEFICIENCY, UNSPECIFIED: ICD-10-CM

## 2020-01-20 DIAGNOSIS — E66.01 MORBID (SEVERE) OBESITY DUE TO EXCESS CALORIES: ICD-10-CM

## 2020-01-20 DIAGNOSIS — I10 ESSENTIAL (PRIMARY) HYPERTENSION: ICD-10-CM

## 2020-01-20 DIAGNOSIS — E61.1 IRON DEFICIENCY: ICD-10-CM

## 2020-01-20 DIAGNOSIS — Z96.652 PRESENCE OF LEFT ARTIFICIAL KNEE JOINT: ICD-10-CM

## 2020-01-20 DIAGNOSIS — E78.5 HYPERLIPIDEMIA, UNSPECIFIED: ICD-10-CM

## 2020-01-21 ENCOUNTER — APPOINTMENT (OUTPATIENT)
Dept: SURGERY | Facility: CLINIC | Age: 55
End: 2020-01-21
Payer: MEDICAID

## 2020-01-21 VITALS
DIASTOLIC BLOOD PRESSURE: 73 MMHG | WEIGHT: 260 LBS | SYSTOLIC BLOOD PRESSURE: 123 MMHG | OXYGEN SATURATION: 97 % | HEART RATE: 77 BPM | TEMPERATURE: 98.3 F | HEIGHT: 66 IN | BODY MASS INDEX: 41.78 KG/M2

## 2020-01-21 PROCEDURE — 99024 POSTOP FOLLOW-UP VISIT: CPT

## 2020-01-21 NOTE — HISTORY OF PRESENT ILLNESS
[de-identified] : Pt is a 55 y/o F 1 wk s/p VSG presents today feeling well. Reports 10 lb wt loss since surgery. Denies fevers, chest pn, sob, calf pain. States she is tolerating her diet at home, drinking plenty of water. States she has had a BM since surgery. States she is walking at home for exercise. Denies pain at incision sites.

## 2020-01-21 NOTE — HISTORY OF PRESENT ILLNESS
[de-identified] : Pt is a 55 y/o F 1 wk s/p VSG presents today feeling well. Reports 10 lb wt loss since surgery. Denies fevers, chest pn, sob, calf pain. States she is tolerating her diet at home, drinking plenty of water. States she has had a BM since surgery. States she is walking at home for exercise. Denies pain at incision sites.

## 2020-01-22 LAB — SURGICAL PATHOLOGY STUDY: SIGNIFICANT CHANGE UP

## 2020-01-22 PROCEDURE — 84100 ASSAY OF PHOSPHORUS: CPT

## 2020-01-22 PROCEDURE — C1889: CPT

## 2020-01-22 PROCEDURE — 88341 IMHCHEM/IMCYTCHM EA ADD ANTB: CPT

## 2020-01-22 PROCEDURE — 85027 COMPLETE CBC AUTOMATED: CPT

## 2020-01-22 PROCEDURE — S2900: CPT

## 2020-01-22 PROCEDURE — 86901 BLOOD TYPING SEROLOGIC RH(D): CPT

## 2020-01-22 PROCEDURE — 86850 RBC ANTIBODY SCREEN: CPT

## 2020-01-22 PROCEDURE — 83735 ASSAY OF MAGNESIUM: CPT

## 2020-01-22 PROCEDURE — 88307 TISSUE EXAM BY PATHOLOGIST: CPT

## 2020-01-22 PROCEDURE — 80048 BASIC METABOLIC PNL TOTAL CA: CPT

## 2020-01-22 PROCEDURE — 88360 TUMOR IMMUNOHISTOCHEM/MANUAL: CPT

## 2020-01-22 PROCEDURE — 36415 COLL VENOUS BLD VENIPUNCTURE: CPT

## 2020-01-22 PROCEDURE — 86803 HEPATITIS C AB TEST: CPT

## 2020-02-14 ENCOUNTER — APPOINTMENT (OUTPATIENT)
Dept: SURGERY | Facility: CLINIC | Age: 55
End: 2020-02-14
Payer: MEDICAID

## 2020-02-14 VITALS
OXYGEN SATURATION: 96 % | BODY MASS INDEX: 41 KG/M2 | TEMPERATURE: 98.3 F | DIASTOLIC BLOOD PRESSURE: 74 MMHG | SYSTOLIC BLOOD PRESSURE: 135 MMHG | HEART RATE: 81 BPM | WEIGHT: 255.13 LBS | HEIGHT: 66 IN

## 2020-02-14 PROCEDURE — 99024 POSTOP FOLLOW-UP VISIT: CPT

## 2020-02-14 NOTE — HISTORY OF PRESENT ILLNESS
[de-identified] : 55 y/o F with Hx of Morbid obesity, HLD, HTN, B/L knee pain, now s/p VSG on 1/13/2020, presents here today for a post op visit. She reports here feeling well. She is tolerating her diet but complains of nausea at home and not snacking in between meals due to this. She has lost 5lbs since her last visit. She is walking on the treadmill daily for 30mins. She is also on Protonix and states that she only has a month's supply. She will be seeing GI soon as well.

## 2020-02-14 NOTE — ASSESSMENT
[FreeTextEntry1] : 55 y/o F with Hx of Morbid obesity, HLD, HTN, B/L knee pain, now s/p VSG on 1/13/2020. Patient is doing well. She is cleared to start soft solids and advised to snack in between meals. Will see nutritionist for dietary adjustment. Will provided Protonix refill for 2 months. Pathology report returned abnormal, suggestive of autoimmune metaplastic atrophic gastritis. Pt will be seeing GI to further evaluate this. To follow up here in 1 month.

## 2020-02-14 NOTE — ADDENDUM
[FreeTextEntry1] : Documented by Phill Redding acting as a scribe for Dr. Connor Lepe on 02/14/2020\par

## 2020-02-14 NOTE — END OF VISIT
[FreeTextEntry3] : All medical record entries made by the Scribe were at my, Dr. Lepe's direction and personally dictated by me on 02/14/2020  I have reviewed the chart and agree that the record accurately reflects my personal performance of the history, physical exam, assessment and plan. I have also personally directed, reviewed, and agreed with the chart.\par \par

## 2020-03-06 ENCOUNTER — APPOINTMENT (OUTPATIENT)
Dept: SURGERY | Facility: CLINIC | Age: 55
End: 2020-03-06
Payer: MEDICARE

## 2020-03-06 VITALS
WEIGHT: 250 LBS | SYSTOLIC BLOOD PRESSURE: 117 MMHG | HEIGHT: 66 IN | TEMPERATURE: 97.6 F | HEART RATE: 67 BPM | BODY MASS INDEX: 40.18 KG/M2 | OXYGEN SATURATION: 98 % | DIASTOLIC BLOOD PRESSURE: 71 MMHG

## 2020-03-06 DIAGNOSIS — Z98.84 BARIATRIC SURGERY STATUS: ICD-10-CM

## 2020-03-06 PROCEDURE — 99024 POSTOP FOLLOW-UP VISIT: CPT

## 2020-03-06 NOTE — ASSESSMENT
[FreeTextEntry1] : 55 y/o F with Hx of Morbid obesity, HLD, HTN, B/L knee pain s/p VSG on 1/13/2020 here for a post op visit. Pt is doing well overall but has only lost ~20 lb since the operation. I advised pt to start weight training to speed up weight loss. I provided her with a resistance band. Pt c/o hard stools so I advised her to take Benefiber or Metamucil. She will meet today with nutrition for dietary counseling. I referred patient to GI Dr. Rivas to address autoimmune metaplastic atrophic gastritis diagnosis. Patient will follow up here in 2 months.\par \par

## 2020-03-06 NOTE — ADDENDUM
[FreeTextEntry1] : This note was written by Kamilla Lundberg on 03/06/2020 acting as scribe for Dr. Lepe

## 2020-03-06 NOTE — HISTORY OF PRESENT ILLNESS
[de-identified] : 55 y/o F with Hx of Morbid obesity, HLD, HTN, B/L knee pain, now s/p VSG on 1/13/2020, presents here today for a post op visit. Pt reports feeling well overall but is not satisfied with her weight loss because she has only lost 5 lb in the last month and 20 lb since the operation. She denies fever, chills, belly pains. Pt reports consuming a lot of fruits and vegetables. She has been fasting for several hours during the day since 2/26/20 for [employer] reasons but eats every few hours after 3 pm. She reports reflux after eating too fast. She endorses drinking a lot of water but reports hard BM. Pt walks outside and on a treadmill for exercise. She plans to see GI Dr. Rivas to address diagnosis of autoimmune metaplastic atrophic gastritis but needs a referral.

## 2020-03-06 NOTE — END OF VISIT
[FreeTextEntry3] : All medical record entries made by the Scribe were at my, Dr. Lepe's, discretion and personally dictated by me on 03/06/2020. I have reviewed the chart and agree that the record accurately reflects my personal performance of the history, physical exam, assessment and plan. I have also personally directed, reviewed and agreed to the chart.

## 2020-03-17 NOTE — PATIENT PROFILE ADULT - ARRIVAL FROM
Called patient because I see she had an appointment with me yesterday that was canceled.  Patient states she did not think she should have have the appointment.  However after reading the notes looks like she had some angioedema earlier in the month.  Her blood pressure was changed to amlodipine and last week is increased from 2.5 mg to 5 mg.  Since that time she is doing well her blood pressures have been running 100 over 70-1 teens over 70s.  And she is feeling fine.  
Home

## 2020-04-21 ENCOUNTER — APPOINTMENT (OUTPATIENT)
Dept: GASTROENTEROLOGY | Facility: CLINIC | Age: 55
End: 2020-04-21

## 2020-05-01 ENCOUNTER — APPOINTMENT (OUTPATIENT)
Dept: SURGERY | Facility: CLINIC | Age: 55
End: 2020-05-01

## 2020-05-05 ENCOUNTER — APPOINTMENT (OUTPATIENT)
Dept: SURGERY | Facility: CLINIC | Age: 55
End: 2020-05-05
Payer: MEDICARE

## 2020-05-05 VITALS — WEIGHT: 243 LBS | BODY MASS INDEX: 39.05 KG/M2 | HEIGHT: 66 IN

## 2020-05-05 DIAGNOSIS — K29.40 CHRONIC ATROPHIC GASTRITIS W/OUT BLEEDING: ICD-10-CM

## 2020-05-05 PROCEDURE — 99442: CPT | Mod: CR

## 2020-05-05 NOTE — PLAN
[FreeTextEntry1] : Nutriiton\par F/u with  egd results, fax to office\par F/u in office in 3 mo
regular rate and rhythm

## 2020-05-05 NOTE — HISTORY OF PRESENT ILLNESS
[de-identified] : Pt is a 55 y/o F 4 mo s/p VSG surgery who agreed to proceed with a phone consultation today amid covid19, states she is feeling well.  Pt states she is doing okay at home but feels her weight loss is a little sluggish, reports weighing 243. Previous wt was 250 anf she feels she cannot get below her current weight. States she is having 2 protein shakes daily, typically if she is not having a solid meal she has the 2nd one. Discussed with pt she may not need 2 shakes daily if she is meeting her protein requirements with solid foods. Will review with nutrition.  Reports snacking on crackers and peanut butter, cauliflower chips which contains 1 g of sugar. Reports having a stuck feeling with certain foods. Reviewed the importance of chewing well and slowly. Recommended quinoa in place of rice and discussed having less then 10g of sugar per serving for each food she consumes. States she has resistance bands at home, has been unable to walk on the treadmill due to her knees hurting. Recommended online videos to follow for exercise. Denies n,v,c,d, denies reflux. Pt followed-up with  and had an egd to evaluate autoimmune metaplastic atrophic gastritis dx'ed from VSG path, needs to follow-up with results and will fax to us. Pt taking all of her vitamins daily. Approximately 15 minutes spent consulting with pt.

## 2020-07-16 ENCOUNTER — APPOINTMENT (OUTPATIENT)
Dept: ORTHOPEDIC SURGERY | Facility: CLINIC | Age: 55
End: 2020-07-16
Payer: OTHER MISCELLANEOUS

## 2020-07-16 VITALS — HEIGHT: 66 IN | WEIGHT: 230 LBS | BODY MASS INDEX: 36.96 KG/M2

## 2020-07-16 VITALS — TEMPERATURE: 96 F

## 2020-07-16 PROCEDURE — 73562 X-RAY EXAM OF KNEE 3: CPT | Mod: RT

## 2020-07-16 PROCEDURE — 99214 OFFICE O/P EST MOD 30 MIN: CPT

## 2020-07-16 NOTE — REASON FOR VISIT
[Follow-Up Visit] : a follow-up visit for [FreeTextEntry2] : RIGHT KNEE PAIN -WOULD L MORTEZA KAREN

## 2020-07-16 NOTE — PHYSICAL EXAM
[de-identified] : Right knee has medial joint line tenderness range of motion 0-110°. Neurovascular intact distally.\par \par Left knee has medial joint line tenderness there is warmth range of motion 0-120°. Neurovascular intact distally. [de-identified] : Radiographically repeated today AP standing individual lateral and sunrise views. The right knee shows complete loss of cartilage on the medial aspect of the right knee on standing AP projection.

## 2020-07-16 NOTE — DISCUSSION/SUMMARY
[Surgical risks reviewed] : Surgical risks reviewed [de-identified] : Patient defers a cortisone injection she instead would prefer to proceed with knee replacement surgery. Reasonable risk benefits knee replacement discussed in detail patient asked appropriate questions and questions were answered to her satisfaction. We talked about the potential complications such need for revision surgery deep tissue infection stiffness component where loosening subsidence. Patient understands because she is only 55 years old she may require surgery for component wear and loosening in the future.

## 2020-07-16 NOTE — HISTORY OF PRESENT ILLNESS
[de-identified] : Patient is here because of recurrent right knee pain on the medial aspect. It seemed best to cortisone injections and Orthovisc injections she is here to consider cortisone injection repeat for knee replacement surgery.

## 2020-07-30 ENCOUNTER — TRANSCRIPTION ENCOUNTER (OUTPATIENT)
Age: 55
End: 2020-07-30

## 2020-08-02 ENCOUNTER — LABORATORY RESULT (OUTPATIENT)
Age: 55
End: 2020-08-02

## 2020-08-04 ENCOUNTER — TRANSCRIPTION ENCOUNTER (OUTPATIENT)
Age: 55
End: 2020-08-04

## 2020-08-04 VITALS
TEMPERATURE: 98 F | HEIGHT: 66 IN | SYSTOLIC BLOOD PRESSURE: 103 MMHG | HEART RATE: 74 BPM | DIASTOLIC BLOOD PRESSURE: 71 MMHG | WEIGHT: 239.64 LBS | RESPIRATION RATE: 16 BRPM | OXYGEN SATURATION: 99 %

## 2020-08-04 RX ORDER — MELOXICAM 15 MG/1
1 TABLET ORAL
Qty: 0 | Refills: 0 | DISCHARGE

## 2020-08-04 RX ORDER — MUPIROCIN 20 MG/G
1 OINTMENT TOPICAL
Qty: 0 | Refills: 0 | DISCHARGE

## 2020-08-04 RX ORDER — ATORVASTATIN CALCIUM 80 MG/1
1 TABLET, FILM COATED ORAL
Qty: 0 | Refills: 0 | DISCHARGE

## 2020-08-04 RX ORDER — POVIDONE-IODINE 5 %
1 AEROSOL (ML) TOPICAL ONCE
Refills: 0 | Status: COMPLETED | OUTPATIENT
Start: 2020-08-05 | End: 2020-08-05

## 2020-08-04 RX ORDER — LEVOCETIRIZINE DIHYDROCHLORIDE 0.5 MG/ML
1 SOLUTION ORAL
Qty: 0 | Refills: 0 | DISCHARGE

## 2020-08-04 NOTE — H&P ADULT - NSHPPHYSICALEXAM_GEN_ALL_CORE
PE: Decreased ROM to right knee secondary to pain  Rest of PE per medical clearance PE: Decreased ROM to right knee secondary to pain  Skin warm and well perfused, no visible wounds/erythema/ecchymoses  EHL/FHL/TA/GS 5/5 motor strength bilaterally   SLT in tact to distal bilateral lower extremities   DP/PT pulses 2+   Remainder of PE per medical clearance

## 2020-08-04 NOTE — H&P ADULT - HISTORY OF PRESENT ILLNESS
54F with right knee pain  Presents today for right TKA. 54F with right knee pain x chronic. Pt states she has had multiple falls at work over the years. She states her right knee pain is localized. She has taken meloxicam in the past for pain control. She denies numbness/tingling/weakness of bilateral lower extremities. Pt ambulates with a cane x 3 years. Pt denies DVT hx; denies CP, SOB, N/V, tactile fevers, calf pain.     Presents today for right TKA.

## 2020-08-04 NOTE — H&P ADULT - PROBLEM SELECTOR PLAN 1
Admit to Orthopaedic Service.  Presents today for elective right TKA   Pt medically stable and cleared for procedure today by Dr. Rehman

## 2020-08-04 NOTE — H&P ADULT - NSICDXPASTMEDICALHX_GEN_ALL_CORE_FT
PAST MEDICAL HISTORY:  Allergy seasonal    Eczema     Hypercholesteremia     Hypertension     Iron deficiency     Osteoarthritis     Vitamin D deficiency

## 2020-08-05 ENCOUNTER — INPATIENT (INPATIENT)
Facility: HOSPITAL | Age: 55
LOS: 1 days | Discharge: HOME HEALTH | DRG: 470 | End: 2020-08-07
Attending: SPECIALIST | Admitting: SPECIALIST
Payer: OTHER MISCELLANEOUS

## 2020-08-05 ENCOUNTER — APPOINTMENT (OUTPATIENT)
Dept: ORTHOPEDIC SURGERY | Facility: HOSPITAL | Age: 55
End: 2020-08-05
Payer: OTHER MISCELLANEOUS

## 2020-08-05 DIAGNOSIS — E61.1 IRON DEFICIENCY: ICD-10-CM

## 2020-08-05 DIAGNOSIS — L30.9 DERMATITIS, UNSPECIFIED: ICD-10-CM

## 2020-08-05 DIAGNOSIS — M19.90 UNSPECIFIED OSTEOARTHRITIS, UNSPECIFIED SITE: ICD-10-CM

## 2020-08-05 DIAGNOSIS — D25.1 INTRAMURAL LEIOMYOMA OF UTERUS: Chronic | ICD-10-CM

## 2020-08-05 DIAGNOSIS — E78.00 PURE HYPERCHOLESTEROLEMIA, UNSPECIFIED: ICD-10-CM

## 2020-08-05 DIAGNOSIS — E55.9 VITAMIN D DEFICIENCY, UNSPECIFIED: ICD-10-CM

## 2020-08-05 DIAGNOSIS — Z98.890 OTHER SPECIFIED POSTPROCEDURAL STATES: Chronic | ICD-10-CM

## 2020-08-05 DIAGNOSIS — Z90.3 ACQUIRED ABSENCE OF STOMACH [PART OF]: Chronic | ICD-10-CM

## 2020-08-05 DIAGNOSIS — I10 ESSENTIAL (PRIMARY) HYPERTENSION: ICD-10-CM

## 2020-08-05 DIAGNOSIS — T78.40XA ALLERGY, UNSPECIFIED, INITIAL ENCOUNTER: ICD-10-CM

## 2020-08-05 PROCEDURE — 27447 TOTAL KNEE ARTHROPLASTY: CPT | Mod: RT

## 2020-08-05 PROCEDURE — 73560 X-RAY EXAM OF KNEE 1 OR 2: CPT | Mod: 26,RT

## 2020-08-05 PROCEDURE — 27447 TOTAL KNEE ARTHROPLASTY: CPT | Mod: AS,RT

## 2020-08-05 RX ORDER — HYDROMORPHONE HYDROCHLORIDE 2 MG/ML
0.5 INJECTION INTRAMUSCULAR; INTRAVENOUS; SUBCUTANEOUS EVERY 4 HOURS
Refills: 0 | Status: DISCONTINUED | OUTPATIENT
Start: 2020-08-05 | End: 2020-08-07

## 2020-08-05 RX ORDER — CHLORHEXIDINE GLUCONATE 213 G/1000ML
1 SOLUTION TOPICAL ONCE
Refills: 0 | Status: COMPLETED | OUTPATIENT
Start: 2020-08-05 | End: 2020-08-05

## 2020-08-05 RX ORDER — OLOPATADINE HYDROCHLORIDE 1 MG/ML
1 SOLUTION/ DROPS OPHTHALMIC
Qty: 0 | Refills: 0 | DISCHARGE

## 2020-08-05 RX ORDER — CELECOXIB 200 MG/1
400 CAPSULE ORAL ONCE
Refills: 0 | Status: COMPLETED | OUTPATIENT
Start: 2020-08-05 | End: 2020-08-05

## 2020-08-05 RX ORDER — ONDANSETRON 8 MG/1
4 TABLET, FILM COATED ORAL EVERY 6 HOURS
Refills: 0 | Status: DISCONTINUED | OUTPATIENT
Start: 2020-08-05 | End: 2020-08-07

## 2020-08-05 RX ORDER — GABAPENTIN 400 MG/1
400 CAPSULE ORAL THREE TIMES A DAY
Refills: 0 | Status: DISCONTINUED | OUTPATIENT
Start: 2020-08-05 | End: 2020-08-07

## 2020-08-05 RX ORDER — MORPHINE SULFATE 50 MG/1
4 CAPSULE, EXTENDED RELEASE ORAL
Refills: 0 | Status: DISCONTINUED | OUTPATIENT
Start: 2020-08-05 | End: 2020-08-05

## 2020-08-05 RX ORDER — SODIUM CHLORIDE 9 MG/ML
1000 INJECTION, SOLUTION INTRAVENOUS
Refills: 0 | Status: DISCONTINUED | OUTPATIENT
Start: 2020-08-05 | End: 2020-08-07

## 2020-08-05 RX ORDER — CEFAZOLIN SODIUM 1 G
2000 VIAL (EA) INJECTION EVERY 8 HOURS
Refills: 0 | Status: COMPLETED | OUTPATIENT
Start: 2020-08-05 | End: 2020-08-06

## 2020-08-05 RX ORDER — OXYCODONE HYDROCHLORIDE 5 MG/1
20 TABLET ORAL EVERY 12 HOURS
Refills: 0 | Status: DISCONTINUED | OUTPATIENT
Start: 2020-08-05 | End: 2020-08-05

## 2020-08-05 RX ORDER — HYDROMORPHONE HYDROCHLORIDE 2 MG/ML
0.5 INJECTION INTRAMUSCULAR; INTRAVENOUS; SUBCUTANEOUS ONCE
Refills: 0 | Status: DISCONTINUED | OUTPATIENT
Start: 2020-08-05 | End: 2020-08-05

## 2020-08-05 RX ORDER — OXYCODONE HYDROCHLORIDE 5 MG/1
5 TABLET ORAL EVERY 4 HOURS
Refills: 0 | Status: DISCONTINUED | OUTPATIENT
Start: 2020-08-05 | End: 2020-08-07

## 2020-08-05 RX ORDER — SENNA PLUS 8.6 MG/1
2 TABLET ORAL AT BEDTIME
Refills: 0 | Status: DISCONTINUED | OUTPATIENT
Start: 2020-08-05 | End: 2020-08-07

## 2020-08-05 RX ORDER — ACETAMINOPHEN 500 MG
650 TABLET ORAL EVERY 6 HOURS
Refills: 0 | Status: DISCONTINUED | OUTPATIENT
Start: 2020-08-05 | End: 2020-08-07

## 2020-08-05 RX ORDER — GABAPENTIN 400 MG/1
1 CAPSULE ORAL
Qty: 0 | Refills: 0 | DISCHARGE

## 2020-08-05 RX ORDER — POLYETHYLENE GLYCOL 3350 17 G/17G
17 POWDER, FOR SOLUTION ORAL DAILY
Refills: 0 | Status: DISCONTINUED | OUTPATIENT
Start: 2020-08-05 | End: 2020-08-07

## 2020-08-05 RX ORDER — LOSARTAN POTASSIUM 100 MG/1
1 TABLET, FILM COATED ORAL
Qty: 0 | Refills: 0 | DISCHARGE

## 2020-08-05 RX ORDER — OXYCODONE HYDROCHLORIDE 5 MG/1
20 TABLET ORAL ONCE
Refills: 0 | Status: DISCONTINUED | OUTPATIENT
Start: 2020-08-05 | End: 2020-08-05

## 2020-08-05 RX ORDER — HYDROMORPHONE HYDROCHLORIDE 2 MG/ML
0.5 INJECTION INTRAMUSCULAR; INTRAVENOUS; SUBCUTANEOUS
Refills: 0 | Status: DISCONTINUED | OUTPATIENT
Start: 2020-08-05 | End: 2020-08-07

## 2020-08-05 RX ORDER — ASPIRIN/CALCIUM CARB/MAGNESIUM 324 MG
325 TABLET ORAL DAILY
Refills: 0 | Status: DISCONTINUED | OUTPATIENT
Start: 2020-08-06 | End: 2020-08-07

## 2020-08-05 RX ORDER — OXYCODONE HYDROCHLORIDE 5 MG/1
10 TABLET ORAL EVERY 4 HOURS
Refills: 0 | Status: DISCONTINUED | OUTPATIENT
Start: 2020-08-05 | End: 2020-08-07

## 2020-08-05 RX ADMIN — OXYCODONE HYDROCHLORIDE 20 MILLIGRAM(S): 5 TABLET ORAL at 08:42

## 2020-08-05 RX ADMIN — Medication 100 MILLIGRAM(S): at 18:04

## 2020-08-05 RX ADMIN — CELECOXIB 400 MILLIGRAM(S): 200 CAPSULE ORAL at 08:37

## 2020-08-05 RX ADMIN — MORPHINE SULFATE 4 MILLIGRAM(S): 50 CAPSULE, EXTENDED RELEASE ORAL at 15:11

## 2020-08-05 RX ADMIN — Medication 650 MILLIGRAM(S): at 18:17

## 2020-08-05 RX ADMIN — OXYCODONE HYDROCHLORIDE 10 MILLIGRAM(S): 5 TABLET ORAL at 21:15

## 2020-08-05 RX ADMIN — MORPHINE SULFATE 4 MILLIGRAM(S): 50 CAPSULE, EXTENDED RELEASE ORAL at 15:32

## 2020-08-05 RX ADMIN — HYDROMORPHONE HYDROCHLORIDE 0.5 MILLIGRAM(S): 2 INJECTION INTRAMUSCULAR; INTRAVENOUS; SUBCUTANEOUS at 18:35

## 2020-08-05 RX ADMIN — OXYCODONE HYDROCHLORIDE 20 MILLIGRAM(S): 5 TABLET ORAL at 08:38

## 2020-08-05 RX ADMIN — HYDROMORPHONE HYDROCHLORIDE 0.5 MILLIGRAM(S): 2 INJECTION INTRAMUSCULAR; INTRAVENOUS; SUBCUTANEOUS at 16:40

## 2020-08-05 RX ADMIN — OXYCODONE HYDROCHLORIDE 10 MILLIGRAM(S): 5 TABLET ORAL at 22:27

## 2020-08-05 RX ADMIN — MORPHINE SULFATE 4 MILLIGRAM(S): 50 CAPSULE, EXTENDED RELEASE ORAL at 15:47

## 2020-08-05 RX ADMIN — Medication 1 APPLICATION(S): at 07:42

## 2020-08-05 RX ADMIN — HYDROMORPHONE HYDROCHLORIDE 0.5 MILLIGRAM(S): 2 INJECTION INTRAMUSCULAR; INTRAVENOUS; SUBCUTANEOUS at 16:25

## 2020-08-05 RX ADMIN — HYDROMORPHONE HYDROCHLORIDE 0.5 MILLIGRAM(S): 2 INJECTION INTRAMUSCULAR; INTRAVENOUS; SUBCUTANEOUS at 18:20

## 2020-08-05 RX ADMIN — SODIUM CHLORIDE 100 MILLILITER(S): 9 INJECTION, SOLUTION INTRAVENOUS at 19:00

## 2020-08-05 RX ADMIN — GABAPENTIN 400 MILLIGRAM(S): 400 CAPSULE ORAL at 21:15

## 2020-08-05 RX ADMIN — MORPHINE SULFATE 4 MILLIGRAM(S): 50 CAPSULE, EXTENDED RELEASE ORAL at 15:26

## 2020-08-05 RX ADMIN — Medication 650 MILLIGRAM(S): at 19:02

## 2020-08-05 RX ADMIN — CHLORHEXIDINE GLUCONATE 1 APPLICATION(S): 213 SOLUTION TOPICAL at 08:41

## 2020-08-05 RX ADMIN — CELECOXIB 400 MILLIGRAM(S): 200 CAPSULE ORAL at 08:42

## 2020-08-05 RX ADMIN — Medication 650 MILLIGRAM(S): at 23:01

## 2020-08-05 RX ADMIN — GABAPENTIN 400 MILLIGRAM(S): 400 CAPSULE ORAL at 14:38

## 2020-08-05 NOTE — PROGRESS NOTE ADULT - SUBJECTIVE AND OBJECTIVE BOX
Orthopaedic Surgery Post Operative Check    Procedure: right total knee replacement   Surgeon: Dr. Nieto     Pt comfortable without complaints, pain controlled  Denies CP, SOB    Vital Signs Last 24 Hrs  T(C): 35.9 (08-05-20 @ 12:05), Max: 35.9 (08-05-20 @ 12:05)  T(F): 96.6 (08-05-20 @ 12:05), Max: 96.6 (08-05-20 @ 12:05)  HR: 50 (08-05-20 @ 15:05) (50 - 58)  BP: 118/80 (08-05-20 @ 15:05) (115/67 - 133/75)  BP(mean): 95 (08-05-20 @ 15:05) (82 - 99)  RR: 20 (08-05-20 @ 15:05) (12 - 21)  SpO2: 100% (08-05-20 @ 15:05) (96% - 100%)  AVSS    General: Pt Alert and oriented, NAD  DSG C/D/I right knee with cryocuff   Skin well perfused   Sensation: intact to bilateral lower extremities, somewhat diminished to dorsum of right foot compared to left foot secondary to spinal anaesthesia   Motor: EHL/FHL/TA/GS 5/5 bilateral lower extremities           Post-op X-Ray: right knee prosthesis in place      A/P: 54yFemale POD#0 s/p right knee total knee replacement   - Stable  - Pain Control  - DVT ppx: ASA  - Post op abx: ancef   - PT, WBS:  wbat, pending PT evaluation  - f/u AM labs   - f/u sensory exam right foot     Ortho Pager [phone]

## 2020-08-05 NOTE — PHYSICAL THERAPY INITIAL EVALUATION ADULT - ACTIVE RANGE OF MOTION EXAMINATION, REHAB EVAL
randal. upper extremity Active ROM was WNL (within normal limits)/R knee flexion 0-90/Left LE Active ROM was WFL (within functional limits)

## 2020-08-05 NOTE — PHYSICAL THERAPY INITIAL EVALUATION ADULT - CRITERIA FOR SKILLED THERAPEUTIC INTERVENTIONS
impairments found/functional limitations in following categories/therapy frequency/anticipated equipment needs at discharge/anticipated discharge recommendation/rehab potential

## 2020-08-05 NOTE — PHYSICAL THERAPY INITIAL EVALUATION ADULT - PERTINENT HX OF CURRENT PROBLEM, REHAB EVAL
54F with right knee pain x chronic. Pt states she has had multiple falls at work over the years. She states her right knee pain is localized. She has taken meloxicam in the past for pain control. She denies numbness/tingling/weakness of bilateral lower extremities. Pt ambulates with a cane x 3 years.

## 2020-08-05 NOTE — PHYSICAL THERAPY INITIAL EVALUATION ADULT - GENERAL OBSERVATIONS, REHAB EVAL
Received supine in recovery +IV hep, DARIAN, EKG, R cryocuff, B SCD. Left as found +RN aaron aware , call turk

## 2020-08-06 ENCOUNTER — TRANSCRIPTION ENCOUNTER (OUTPATIENT)
Age: 55
End: 2020-08-06

## 2020-08-06 LAB
ANION GAP SERPL CALC-SCNC: 7 MMOL/L — SIGNIFICANT CHANGE UP (ref 5–17)
BUN SERPL-MCNC: 12 MG/DL — SIGNIFICANT CHANGE UP (ref 7–23)
CALCIUM SERPL-MCNC: 9 MG/DL — SIGNIFICANT CHANGE UP (ref 8.4–10.5)
CHLORIDE SERPL-SCNC: 104 MMOL/L — SIGNIFICANT CHANGE UP (ref 96–108)
CO2 SERPL-SCNC: 28 MMOL/L — SIGNIFICANT CHANGE UP (ref 22–31)
CREAT SERPL-MCNC: 0.68 MG/DL — SIGNIFICANT CHANGE UP (ref 0.5–1.3)
GLUCOSE SERPL-MCNC: 117 MG/DL — HIGH (ref 70–99)
HCT VFR BLD CALC: 34.6 % — SIGNIFICANT CHANGE UP (ref 34.5–45)
HGB BLD-MCNC: 10.4 G/DL — LOW (ref 11.5–15.5)
MCHC RBC-ENTMCNC: 23.6 PG — LOW (ref 27–34)
MCHC RBC-ENTMCNC: 30.1 GM/DL — LOW (ref 32–36)
MCV RBC AUTO: 78.6 FL — LOW (ref 80–100)
NRBC # BLD: 0 /100 WBCS — SIGNIFICANT CHANGE UP (ref 0–0)
PLATELET # BLD AUTO: 280 K/UL — SIGNIFICANT CHANGE UP (ref 150–400)
POTASSIUM SERPL-MCNC: 3.9 MMOL/L — SIGNIFICANT CHANGE UP (ref 3.5–5.3)
POTASSIUM SERPL-SCNC: 3.9 MMOL/L — SIGNIFICANT CHANGE UP (ref 3.5–5.3)
RBC # BLD: 4.4 M/UL — SIGNIFICANT CHANGE UP (ref 3.8–5.2)
RBC # FLD: 16.6 % — HIGH (ref 10.3–14.5)
SODIUM SERPL-SCNC: 139 MMOL/L — SIGNIFICANT CHANGE UP (ref 135–145)
WBC # BLD: 8.96 K/UL — SIGNIFICANT CHANGE UP (ref 3.8–10.5)
WBC # FLD AUTO: 8.96 K/UL — SIGNIFICANT CHANGE UP (ref 3.8–10.5)

## 2020-08-06 PROCEDURE — 99254 IP/OBS CNSLTJ NEW/EST MOD 60: CPT

## 2020-08-06 RX ORDER — FUROSEMIDE 40 MG
1 TABLET ORAL
Qty: 0 | Refills: 0 | DISCHARGE

## 2020-08-06 RX ORDER — SODIUM CHLORIDE 9 MG/ML
500 INJECTION, SOLUTION INTRAVENOUS ONCE
Refills: 0 | Status: COMPLETED | OUTPATIENT
Start: 2020-08-06 | End: 2020-08-06

## 2020-08-06 RX ORDER — KETOROLAC TROMETHAMINE 30 MG/ML
15 SYRINGE (ML) INJECTION ONCE
Refills: 0 | Status: DISCONTINUED | OUTPATIENT
Start: 2020-08-06 | End: 2020-08-06

## 2020-08-06 RX ORDER — SENNA PLUS 8.6 MG/1
2 TABLET ORAL
Qty: 0 | Refills: 0 | DISCHARGE
Start: 2020-08-06

## 2020-08-06 RX ORDER — POLYETHYLENE GLYCOL 3350 17 G/17G
17 POWDER, FOR SOLUTION ORAL
Qty: 0 | Refills: 0 | DISCHARGE
Start: 2020-08-06

## 2020-08-06 RX ORDER — ASPIRIN/CALCIUM CARB/MAGNESIUM 324 MG
1 TABLET ORAL
Qty: 14 | Refills: 0
Start: 2020-08-06 | End: 2020-08-19

## 2020-08-06 RX ORDER — OXYCODONE HYDROCHLORIDE 5 MG/1
1 TABLET ORAL
Qty: 30 | Refills: 0
Start: 2020-08-06 | End: 2020-08-12

## 2020-08-06 RX ADMIN — GABAPENTIN 400 MILLIGRAM(S): 400 CAPSULE ORAL at 14:50

## 2020-08-06 RX ADMIN — Medication 650 MILLIGRAM(S): at 00:22

## 2020-08-06 RX ADMIN — Medication 650 MILLIGRAM(S): at 06:11

## 2020-08-06 RX ADMIN — Medication 15 MILLIGRAM(S): at 12:40

## 2020-08-06 RX ADMIN — Medication 100 MILLIGRAM(S): at 02:37

## 2020-08-06 RX ADMIN — Medication 650 MILLIGRAM(S): at 17:30

## 2020-08-06 RX ADMIN — Medication 650 MILLIGRAM(S): at 12:30

## 2020-08-06 RX ADMIN — OXYCODONE HYDROCHLORIDE 10 MILLIGRAM(S): 5 TABLET ORAL at 10:10

## 2020-08-06 RX ADMIN — Medication 15 MILLIGRAM(S): at 12:25

## 2020-08-06 RX ADMIN — OXYCODONE HYDROCHLORIDE 10 MILLIGRAM(S): 5 TABLET ORAL at 09:18

## 2020-08-06 RX ADMIN — Medication 650 MILLIGRAM(S): at 11:57

## 2020-08-06 RX ADMIN — OXYCODONE HYDROCHLORIDE 10 MILLIGRAM(S): 5 TABLET ORAL at 21:27

## 2020-08-06 RX ADMIN — Medication 650 MILLIGRAM(S): at 23:13

## 2020-08-06 RX ADMIN — OXYCODONE HYDROCHLORIDE 10 MILLIGRAM(S): 5 TABLET ORAL at 05:12

## 2020-08-06 RX ADMIN — OXYCODONE HYDROCHLORIDE 10 MILLIGRAM(S): 5 TABLET ORAL at 22:27

## 2020-08-06 RX ADMIN — GABAPENTIN 400 MILLIGRAM(S): 400 CAPSULE ORAL at 21:27

## 2020-08-06 RX ADMIN — SODIUM CHLORIDE 1 MILLILITER(S): 9 INJECTION, SOLUTION INTRAVENOUS at 10:15

## 2020-08-06 RX ADMIN — OXYCODONE HYDROCHLORIDE 10 MILLIGRAM(S): 5 TABLET ORAL at 06:12

## 2020-08-06 RX ADMIN — POLYETHYLENE GLYCOL 3350 17 GRAM(S): 17 POWDER, FOR SOLUTION ORAL at 11:57

## 2020-08-06 RX ADMIN — GABAPENTIN 400 MILLIGRAM(S): 400 CAPSULE ORAL at 05:11

## 2020-08-06 RX ADMIN — Medication 325 MILLIGRAM(S): at 11:57

## 2020-08-06 RX ADMIN — Medication 650 MILLIGRAM(S): at 05:11

## 2020-08-06 RX ADMIN — Medication 650 MILLIGRAM(S): at 18:20

## 2020-08-06 NOTE — DISCHARGE NOTE PROVIDER - NSDCHHNEEDSERVICE_GEN_ALL_CORE
Wound care and assessment/Rehabilitation services/Teaching and training/Medication teaching and assessment

## 2020-08-06 NOTE — DISCHARGE NOTE PROVIDER - CARE PROVIDER_API CALL
Pop Nieto  ORTHOPAEDIC SURGERY  [address] [place] [address] [address] [place] [address]  Phone: [phone]  Fax: [phone]  Follow Up Time: Pop Nieto  ORTHOPAEDIC SURGERY  [address] [place] [address] [address] [place] [address]  Phone: [phone]  Fax: [phone]  Follow Up Time: 2 weeks

## 2020-08-06 NOTE — DISCHARGE NOTE PROVIDER - NSDCACTIVITY_GEN_ALL_CORE
Do not drive or operate machinery/Do not make important decisions/Walking - Indoors allowed/Walking - Outdoors allowed

## 2020-08-06 NOTE — DISCHARGE NOTE PROVIDER - NSDCHHBASESERVICE_GEN_ALL_CORE
----- Message from Felipa Marks NP sent at 6/8/2020  2:00 PM CDT -----  Reassure her no fracture. Arthritic changes. Keep wearing the brace.   Physical therapy

## 2020-08-06 NOTE — PROGRESS NOTE ADULT - SUBJECTIVE AND OBJECTIVE BOX
Ortho Note    Pt comfortable without complaints, pain controlled  Denies CP, SOB, N/V, numbness/tingling     Vital Signs Last 24 Hrs  T(C): 36.4 (08-06-20 @ 05:01), Max: 36.4 (08-06-20 @ 05:01)  T(F): 97.6 (08-06-20 @ 05:01), Max: 97.6 (08-06-20 @ 05:01)  HR: 63 (08-06-20 @ 05:01) (52 - 63)  BP: 117/73 (08-06-20 @ 05:01) (117/73 - 119/71)  BP(mean): --  RR: 17 (08-06-20 @ 05:01) (16 - 17)  SpO2: 96% (08-06-20 @ 05:01) (96% - 96%)      General: Pt Alert and oriented, NAD  DSG C/D/I  Pulses: 2+ DP pulse  Sensation: SILT over distal limb and symmetric to contralateral side  Motor: firing EHL/FHL/TA/GS            A/P: 54yFemale s/p R TKA  - Stable  - Pain Control  - DVT ppx: asa  - PT, WBS: WBAT    Ortho Pager [phone]

## 2020-08-06 NOTE — DISCHARGE NOTE PROVIDER - NSDCMRMEDTOKEN_GEN_ALL_CORE_FT
Aspirin Enteric Coated 325 mg oral delayed release tablet: 1 tab(s) orally once a day   furosemide 40 mg oral tablet: 1 tab(s) orally once a day  gabapentin 400 mg oral capsule: 1 cap(s) orally 3 times a day  olopatadine 0.2% ophthalmic solution: 1 drop(s) to each affected eye once a day  oxyCODONE 5 mg oral tablet: 1-2 tab(s) orally every 4-6 hours as needed for moderate to severe pain MDD:6  polyethylene glycol 3350 oral powder for reconstitution: 17 gram(s) orally once a day  senna oral tablet: 2 tab(s) orally once a day (at bedtime), As needed, Constipation  Triamcinolone Acetonide in Absorbase 0.05% topical ointment: Apply topically to affected area 2 times a day Aspirin Enteric Coated 325 mg oral delayed release tablet: 1 tab(s) orally once a day    furosemide 40 mg oral tablet: 1 tab(s) orally once a day, can start on 8/7/2020  gabapentin 400 mg oral capsule: 1 cap(s) orally 3 times a day  olopatadine 0.2% ophthalmic solution: 1 drop(s) to each affected eye once a day  oxyCODONE 5 mg oral tablet: 1-2 tab(s) orally every 4-6 hours as needed for moderate to severe pain MDD:6  polyethylene glycol 3350 oral powder for reconstitution: 17 gram(s) orally once a day  senna oral tablet: 2 tab(s) orally once a day (at bedtime), As needed, Constipation  Triamcinolone Acetonide in Absorbase 0.05% topical ointment: Apply topically to affected area 2 times a day

## 2020-08-06 NOTE — DISCHARGE NOTE PROVIDER - HOSPITAL COURSE
Admitted to Orthopedic service Dr. Nieto     Surgery on 8/5/20    Iman-op Antibiotics    Pain control    DVT prophylaxis    OOB/Physical Therapy

## 2020-08-06 NOTE — CONSULT NOTE ADULT - ASSESSMENT
54 year old female with,    # S/p R TKA: doing well. Care per Ortho. C/w pain meds/bowel regimen. Encouraged IS use. C/w DVT PPx w/ ASA. PT follow up --> HPT.    # Lightheadedness/hypotension: improved with IVF bolus. Encouraged increase PO fluid intake. Minimize opiate use.    # HTN: was on losartan before but taken off recently due to improvement on BP readings. Monitor BP for now.    # LE edema: takes lasix 40mg prn at home for "water retention", hold lasix for now.    # Eczema: c/w steroid cream upon discharge    # BMI of 38.7 --> Obesity.    Thanks for the consult. D/w Orthopedics, will continue to follow up with you.

## 2020-08-06 NOTE — PROGRESS NOTE ADULT - SUBJECTIVE AND OBJECTIVE BOX
POST OPERATIVE DAY #: 1  STATUS POST: Right TKA                 SUBJECTIVE: Patient seen and examined. States to doing well. Patient denies any CP, SOB, fever, chills. numbness/tingling.     Pain:  well controlled      OBJECTIVE:     Vital Signs Last 24 Hrs  T(C): 36.5 (06 Aug 2020 08:06), Max: 36.5 (05 Aug 2020 19:54)  T(F): 97.7 (06 Aug 2020 08:06), Max: 97.7 (05 Aug 2020 19:54)  HR: 60 (06 Aug 2020 08:06) (50 - 63)  BP: 106/57 (06 Aug 2020 08:06) (106/57 - 179/82)  BP(mean): 94 (05 Aug 2020 18:42) (82 - 99)  RR: 15 (06 Aug 2020 08:06) (12 - 21)  SpO2: 99% (06 Aug 2020 08:06) (93% - 100%)    Affected extremity:          Dressing: clean/dry/intact          Sensation: intact to light touch          Motor exam: 5/5 to EHL/TA/GS         warm, well-perfused; capillary refill < 3 seconds              I&O's Detail    05 Aug 2020 07:01  -  06 Aug 2020 07:00  --------------------------------------------------------  IN:    lactated ringers.: 600 mL  Total IN: 600 mL    OUT:    Voided: 850 mL  Total OUT: 850 mL    Total NET: -250 mL          LABS:                        10.4   8.96  )-----------( 280      ( 06 Aug 2020 07:49 )             34.6     08-06    139  |  104  |  12  ----------------------------<  117<H>  3.9   |  28  |  0.68    Ca    9.0      06 Aug 2020 07:49            MEDICATIONS:    acetaminophen   Tablet .. 650 milliGRAM(s) Oral every 6 hours  gabapentin 400 milliGRAM(s) Oral three times a day  HYDROmorphone  Injectable 0.5 milliGRAM(s) IV Push every 15 minutes PRN  HYDROmorphone  Injectable 0.5 milliGRAM(s) IV Push every 4 hours PRN  ondansetron Injectable 4 milliGRAM(s) IV Push every 6 hours PRN  oxyCODONE    IR 5 milliGRAM(s) Oral every 4 hours PRN  oxyCODONE    IR 10 milliGRAM(s) Oral every 4 hours PRN    aspirin enteric coated 325 milliGRAM(s) Oral daily      RADIOLOGY & ADDITIONAL STUDIES:    ASSESSMENT AND PLAN: s/p R TKA    1. Analgesic pain control  2. DVT prophylaxis: ASA    SCDs      4. Weight Bearing Status:  Weight bearing as tolerated       5. Disposition: Home when clearing PT

## 2020-08-06 NOTE — DISCHARGE NOTE PROVIDER - NSDCFUADDINST_GEN_ALL_CORE_FT
Weight bear as tolerated with assistive device.  No strenuous activity, heavy lifting, driving or returning to work until cleared by MD.  You may shower - dressing is water-resistant, no soaking in bathtubs.  Remove dressing after post op day 5-7, then leave incision open to air. Keep incision clean and dry.  Try to have regular bowel movements, take stool softener or laxative if necessary.  Swelling may travel all the way down leg to foot, this is normal and will subside in a few weeks.  Call to schedule an appt with Dr. Nieto for follow up, if you have staples or sutures they will be removed in office.  Contact your doctor if you experience: fever greater than 101.5, chills, chest pain, difficulty breathing, redness or excessive drainage around the incision, other concerns.  Follow up with your primary care provider. Weight bear as tolerated with assistive device.  No strenuous activity, heavy lifting, driving or returning to work until cleared by MD.  You have a DARIAN dressing. It is water resistant, but not waterproof. You may shower; however, the pump should be disconnected and placed in a safe location where it will not get wet.  No soaking in bathtubs.  The dressing has a battery that usually dies in 7 days. Once this occurs, you may remove the dressing and dispose of it, then leave incision open to air. Keep incision clean and dry.    Try to have regular bowel movements, take stool softener or laxative if necessary.  Swelling may travel all the way down leg to foot, this is normal and will subside in a few weeks.  Call to schedule an appt with Dr. Nieto for follow up, if you have staples or sutures they will be removed in office.  Contact your doctor if you experience: fever greater than 101.5, chills, chest pain, difficulty breathing, redness or excessive drainage around the incision, other concerns.  Follow up with your primary care provider.

## 2020-08-06 NOTE — CONSULT NOTE ADULT - SUBJECTIVE AND OBJECTIVE BOX
JACQUELINE GIL  54y  Female    Patient is a 54y old  Female who presents with a chief complaint of right knee pain (06 Aug 2020 09:58)      HPI:  54F with right knee pain x chronic. Pt states she has had multiple falls at work over the years. She states her right knee pain is localized. She has taken meloxicam in the past for pain control. She denies numbness/tingling/weakness of bilateral lower extremities. Pt ambulates with a cane x 3 years. Pt denies DVT hx; denies CP, SOB, N/V, tactile fevers, calf pain.     Presents today for right TKA. (04 Aug 2020 14:00)    S/p R TKA 8/5/2020- uneventful procedure.  Seen by me this morning, sister at bedside. Feeling better at time of visit as had episode of lightheadedness earlier while walking with PT-received IVF bolus afterwards with improvement of symptoms. +Pain on R knee. +Flatus. Good appetite.    PAST MEDICAL & SURGICAL HISTORY:  Allergy: seasonal  Osteoarthritis  Iron deficiency  Vitamin D deficiency  Eczema  Hypercholesteremia  Hypertension  H/O knee surgery: left scope  History of sleeve gastrectomy  Fibroids, intramural: novosure      Home Medications:  furosemide 40 mg oral tablet: 1 tab(s) orally once a day, can start on 8/7/2020 (06 Aug 2020 09:30)  gabapentin 400 mg oral capsule: 1 cap(s) orally 3 times a day (05 Aug 2020 07:27)  olopatadine 0.2% ophthalmic solution: 1 drop(s) to each affected eye once a day (05 Aug 2020 07:27)  polyethylene glycol 3350 oral powder for reconstitution: 17 gram(s) orally once a day (06 Aug 2020 08:59)  senna oral tablet: 2 tab(s) orally once a day (at bedtime), As needed, Constipation (06 Aug 2020 08:59)  Triamcinolone Acetonide in Absorbase 0.05% topical ointment: Apply topically to affected area 2 times a day (05 Aug 2020 07:27)      54y    FAMILY HISTORY:  + DM/HTN both parents    Marital Status:  (   )    (   ) Single    (   )    (  )   Lives with: ( X ) alone  (  ) children   (  ) spouse   (  ) parents  (  ) other  Recent Travel: No recent travel  Occupation:    Substance Use (street drugs): ( x ) never used  (  ) other:  Tobacco Usage:  ( x  ) never smoked   (   ) former smoker   (   ) current smoker  (     ) pack year  Alcohol Usage: None       MEDICATIONS  (STANDING):  acetaminophen   Tablet .. 650 milliGRAM(s) Oral every 6 hours  aspirin enteric coated 325 milliGRAM(s) Oral daily  gabapentin 400 milliGRAM(s) Oral three times a day  lactated ringers. 1000 milliLiter(s) (100 mL/Hr) IV Continuous <Continuous>  polyethylene glycol 3350 17 Gram(s) Oral daily    MEDICATIONS  (PRN):  aluminum hydroxide/magnesium hydroxide/simethicone Suspension 30 milliLiter(s) Oral four times a day PRN Indigestion  HYDROmorphone  Injectable 0.5 milliGRAM(s) IV Push every 15 minutes PRN Breakthrough Pain  HYDROmorphone  Injectable 0.5 milliGRAM(s) IV Push every 4 hours PRN Severe Pain (7 - 10)  ondansetron Injectable 4 milliGRAM(s) IV Push every 6 hours PRN Nausea and/or Vomiting  oxyCODONE    IR 5 milliGRAM(s) Oral every 4 hours PRN Moderate Pain (4 - 6)  oxyCODONE    IR 10 milliGRAM(s) Oral every 4 hours PRN Severe Pain (7 - 10)  senna 2 Tablet(s) Oral at bedtime PRN Constipation    REVIEW OF SYSTEMS:  As HPI otherwise reviewed and negative    Vital Signs Last 24 Hrs  T(C): 36.9 (06 Aug 2020 15:50), Max: 36.9 (06 Aug 2020 15:50)  T(F): 98.4 (06 Aug 2020 15:50), Max: 98.4 (06 Aug 2020 15:50)  HR: 84 (06 Aug 2020 15:50) (52 - 84)  BP: 107/68 (06 Aug 2020 15:50) (97/58 - 119/71)  BP(mean): 81 (06 Aug 2020 15:50) (81 - 81)  RR: 18 (06 Aug 2020 15:50) (15 - 18)  SpO2: 98% (06 Aug 2020 15:50) (96% - 99%)    PHYSICAL EXAM:  GENERAL: NAD, well-groomed, well-developed, obese  HEAD:  Atraumatic, Normocephalic  EYES: EOMI, PERRLA, conjunctiva and sclera clear  ENMT: No tonsillar erythema, exudates, or enlargement; Moist mucous membranes, Good dentition, No lesions  NECK: Supple, No JVD, Normal thyroid  NERVOUS SYSTEM:  Alert & Oriented X3, Good concentration; Motor Strength 5/5 B/L upper and lower extremities; DTRs 2+ intact and symmetric  CHEST/LUNG: Clear to percussion bilaterally; No rales, rhonchi, wheezing, or rubs  HEART: Regular rate and rhythm; No murmurs, rubs, or gallops  ABDOMEN: Soft, Nontender, Nondistended; Bowel sounds present  EXTREMITIES:  R knee with cooler on place  LYMPH: No lymphadenopathy noted  SKIN: No rashes or lesions    Consultant(s) Notes Reviewed:  [x ] YES  [ ] NO  Care Discussed with Consultants/Other Providers [ x] YES  [ ] NO    LABS:                        10.4   8.96  )-----------( 280      ( 06 Aug 2020 07:49 )             34.6     08-06    139  |  104  |  12  ----------------------------<  117<H>  3.9   |  28  |  0.68    Ca    9.0      06 Aug 2020 07:49          CAPILLARY BLOOD GLUCOSE            RADIOLOGY & ADDITIONAL TESTS:

## 2020-08-07 ENCOUNTER — TRANSCRIPTION ENCOUNTER (OUTPATIENT)
Age: 55
End: 2020-08-07

## 2020-08-07 VITALS
SYSTOLIC BLOOD PRESSURE: 135 MMHG | DIASTOLIC BLOOD PRESSURE: 82 MMHG | RESPIRATION RATE: 15 BRPM | HEART RATE: 80 BPM | TEMPERATURE: 98 F | OXYGEN SATURATION: 97 %

## 2020-08-07 LAB
ANION GAP SERPL CALC-SCNC: 7 MMOL/L — SIGNIFICANT CHANGE UP (ref 5–17)
BUN SERPL-MCNC: 11 MG/DL — SIGNIFICANT CHANGE UP (ref 7–23)
CALCIUM SERPL-MCNC: 8.9 MG/DL — SIGNIFICANT CHANGE UP (ref 8.4–10.5)
CHLORIDE SERPL-SCNC: 106 MMOL/L — SIGNIFICANT CHANGE UP (ref 96–108)
CO2 SERPL-SCNC: 29 MMOL/L — SIGNIFICANT CHANGE UP (ref 22–31)
CREAT SERPL-MCNC: 0.7 MG/DL — SIGNIFICANT CHANGE UP (ref 0.5–1.3)
GLUCOSE SERPL-MCNC: 101 MG/DL — HIGH (ref 70–99)
HCT VFR BLD CALC: 31.8 % — LOW (ref 34.5–45)
HGB BLD-MCNC: 9.6 G/DL — LOW (ref 11.5–15.5)
MCHC RBC-ENTMCNC: 23.9 PG — LOW (ref 27–34)
MCHC RBC-ENTMCNC: 30.2 GM/DL — LOW (ref 32–36)
MCV RBC AUTO: 79.1 FL — LOW (ref 80–100)
NRBC # BLD: 0 /100 WBCS — SIGNIFICANT CHANGE UP (ref 0–0)
PLATELET # BLD AUTO: 249 K/UL — SIGNIFICANT CHANGE UP (ref 150–400)
POTASSIUM SERPL-MCNC: 4.6 MMOL/L — SIGNIFICANT CHANGE UP (ref 3.5–5.3)
POTASSIUM SERPL-SCNC: 4.6 MMOL/L — SIGNIFICANT CHANGE UP (ref 3.5–5.3)
RBC # BLD: 4.02 M/UL — SIGNIFICANT CHANGE UP (ref 3.8–5.2)
RBC # FLD: 16.6 % — HIGH (ref 10.3–14.5)
SODIUM SERPL-SCNC: 142 MMOL/L — SIGNIFICANT CHANGE UP (ref 135–145)
WBC # BLD: 11.37 K/UL — HIGH (ref 3.8–10.5)
WBC # FLD AUTO: 11.37 K/UL — HIGH (ref 3.8–10.5)

## 2020-08-07 PROCEDURE — 80048 BASIC METABOLIC PNL TOTAL CA: CPT

## 2020-08-07 PROCEDURE — 85027 COMPLETE CBC AUTOMATED: CPT

## 2020-08-07 PROCEDURE — C1776: CPT

## 2020-08-07 PROCEDURE — 97530 THERAPEUTIC ACTIVITIES: CPT

## 2020-08-07 PROCEDURE — 36415 COLL VENOUS BLD VENIPUNCTURE: CPT

## 2020-08-07 PROCEDURE — 97161 PT EVAL LOW COMPLEX 20 MIN: CPT

## 2020-08-07 PROCEDURE — 73560 X-RAY EXAM OF KNEE 1 OR 2: CPT

## 2020-08-07 PROCEDURE — 99233 SBSQ HOSP IP/OBS HIGH 50: CPT | Mod: GC

## 2020-08-07 PROCEDURE — C1713: CPT

## 2020-08-07 PROCEDURE — 97116 GAIT TRAINING THERAPY: CPT

## 2020-08-07 RX ORDER — ASPIRIN/CALCIUM CARB/MAGNESIUM 324 MG
1 TABLET ORAL
Qty: 10 | Refills: 0
Start: 2020-08-07 | End: 2020-08-16

## 2020-08-07 RX ADMIN — OXYCODONE HYDROCHLORIDE 10 MILLIGRAM(S): 5 TABLET ORAL at 04:37

## 2020-08-07 RX ADMIN — Medication 650 MILLIGRAM(S): at 12:30

## 2020-08-07 RX ADMIN — OXYCODONE HYDROCHLORIDE 10 MILLIGRAM(S): 5 TABLET ORAL at 11:34

## 2020-08-07 RX ADMIN — OXYCODONE HYDROCHLORIDE 10 MILLIGRAM(S): 5 TABLET ORAL at 03:37

## 2020-08-07 RX ADMIN — GABAPENTIN 400 MILLIGRAM(S): 400 CAPSULE ORAL at 05:39

## 2020-08-07 RX ADMIN — POLYETHYLENE GLYCOL 3350 17 GRAM(S): 17 POWDER, FOR SOLUTION ORAL at 11:34

## 2020-08-07 RX ADMIN — Medication 650 MILLIGRAM(S): at 05:39

## 2020-08-07 RX ADMIN — OXYCODONE HYDROCHLORIDE 10 MILLIGRAM(S): 5 TABLET ORAL at 12:30

## 2020-08-07 RX ADMIN — Medication 650 MILLIGRAM(S): at 11:34

## 2020-08-07 RX ADMIN — Medication 650 MILLIGRAM(S): at 06:39

## 2020-08-07 RX ADMIN — OXYCODONE HYDROCHLORIDE 5 MILLIGRAM(S): 5 TABLET ORAL at 09:00

## 2020-08-07 RX ADMIN — OXYCODONE HYDROCHLORIDE 5 MILLIGRAM(S): 5 TABLET ORAL at 08:13

## 2020-08-07 RX ADMIN — Medication 650 MILLIGRAM(S): at 00:13

## 2020-08-07 RX ADMIN — Medication 325 MILLIGRAM(S): at 11:34

## 2020-08-07 RX ADMIN — GABAPENTIN 400 MILLIGRAM(S): 400 CAPSULE ORAL at 13:53

## 2020-08-07 NOTE — DISCHARGE NOTE NURSING/CASE MANAGEMENT/SOCIAL WORK - PATIENT PORTAL LINK FT
You can access the FollowMyHealth Patient Portal offered by [facility] by registering at the following website: [email or web address]. By joining [facility]’s FollowMyHealth portal, you will also be able to view your health information using other applications (apps) compatible with our system.

## 2020-08-07 NOTE — PROGRESS NOTE ADULT - SUBJECTIVE AND OBJECTIVE BOX
Ortho Note    Surgery: s/p R TKA POD #2  Patient seen and examined at bedside this AM   Pt comfortable without complaints, pain controlled  Denies CP, SOB, N/V, numbness/tingling     Vital Signs Last 24 Hrs  T(C): 36.7 (08-07-20 @ 08:11), Max: 36.7 (08-07-20 @ 08:11)  T(F): 98 (08-07-20 @ 08:11), Max: 98 (08-07-20 @ 08:11)  HR: 80 (08-07-20 @ 08:11) (80 - 80)  BP: 135/82 (08-07-20 @ 08:11) (135/82 - 135/82)  BP(mean): --  RR: 15 (08-07-20 @ 08:11) (15 - 15)  SpO2: 97% (08-07-20 @ 08:11) (97% - 97%)  AVSS    General: Pt Alert and oriented, NAD  Dressing C/D/I: park R knee   Pulses: 2+ DP pulses   Sensation: intact to light touch RLE   Motor: EHL/FHL/TA/GS 5/5                           9.6    11.37 )-----------( 249      ( 07 Aug 2020 08:18 )             31.8   07 Aug 2020 08:18    142    |  106    |  11     ----------------------------<  101    4.6     |  29     |  0.70     Ca    8.9        07 Aug 2020 08:18        A/P: 54yFemale POD# 2 s/p R TKA     - medically Stable  - Pain Control as needed  - DVT ppx: ASA 325mg daily   - PT, WBS: WBAT  - dispo home today     Ortho Pager [phone]

## 2020-08-07 NOTE — PROGRESS NOTE ADULT - SUBJECTIVE AND OBJECTIVE BOX
O/N and interval events: None    Subjective:  Feels well. Reports pain at surgical site but otherwise no CP or SOB. ROS Is otherwise negative.       PAST MEDICAL & SURGICAL HISTORY:  Allergy: seasonal  Osteoarthritis  Iron deficiency  Vitamin D deficiency  Eczema  Hypercholesteremia  Hypertension  H/O knee surgery: left scope  History of sleeve gastrectomy  Fibroids, intramural: novosure      Home Medications:  furosemide 40 mg oral tablet: 1 tab(s) orally once a day, can start on 8/7/2020 (06 Aug 2020 09:30)  gabapentin 400 mg oral capsule: 1 cap(s) orally 3 times a day (05 Aug 2020 07:27)  olopatadine 0.2% ophthalmic solution: 1 drop(s) to each affected eye once a day (05 Aug 2020 07:27)  polyethylene glycol 3350 oral powder for reconstitution: 17 gram(s) orally once a day (06 Aug 2020 08:59)  senna oral tablet: 2 tab(s) orally once a day (at bedtime), As needed, Constipation (06 Aug 2020 08:59)  Triamcinolone Acetonide in Absorbase 0.05% topical ointment: Apply topically to affected area 2 times a day (05 Aug 2020 07:27)      54y    FAMILY HISTORY:  + DM/HTN both parents    Marital Status:  (   )    (   ) Single    (   )    (  )   Lives with: ( X ) alone  (  ) children   (  ) spouse   (  ) parents  (  ) other  Recent Travel: No recent travel  Occupation:    Substance Use (street drugs): ( x ) never used  (  ) other:  Tobacco Usage:  ( x  ) never smoked   (   ) former smoker   (   ) current smoker  (     ) pack year  Alcohol Usage: None       MEDICATIONS  (STANDING):  acetaminophen   Tablet .. 650 milliGRAM(s) Oral every 6 hours  aspirin enteric coated 325 milliGRAM(s) Oral daily  gabapentin 400 milliGRAM(s) Oral three times a day  lactated ringers. 1000 milliLiter(s) (100 mL/Hr) IV Continuous <Continuous>  polyethylene glycol 3350 17 Gram(s) Oral daily    MEDICATIONS  (PRN):  aluminum hydroxide/magnesium hydroxide/simethicone Suspension 30 milliLiter(s) Oral four times a day PRN Indigestion  bisacodyl Suppository 10 milliGRAM(s) Rectal daily PRN If no bowel movement by postoperative day #2  HYDROmorphone  Injectable 0.5 milliGRAM(s) IV Push every 15 minutes PRN Breakthrough Pain  HYDROmorphone  Injectable 0.5 milliGRAM(s) IV Push every 4 hours PRN Severe Pain (7 - 10)  ondansetron Injectable 4 milliGRAM(s) IV Push every 6 hours PRN Nausea and/or Vomiting  oxyCODONE    IR 5 milliGRAM(s) Oral every 4 hours PRN Moderate Pain (4 - 6)  oxyCODONE    IR 10 milliGRAM(s) Oral every 4 hours PRN Severe Pain (7 - 10)  senna 2 Tablet(s) Oral at bedtime PRN Constipation      REVIEW OF SYSTEMS:  As HPI otherwise reviewed and negative    Vital Signs Last 24 Hrs  T(C): 36.7 (07 Aug 2020 08:11), Max: 36.9 (06 Aug 2020 15:50)  T(F): 98 (07 Aug 2020 08:11), Max: 98.5 (06 Aug 2020 20:45)  HR: 80 (07 Aug 2020 08:11) (80 - 88)  BP: 135/82 (07 Aug 2020 08:11) (107/68 - 135/82)  BP(mean): 81 (06 Aug 2020 15:50) (81 - 81)  RR: 15 (07 Aug 2020 08:11) (15 - 18)  SpO2: 97% (07 Aug 2020 08:11) (97% - 99%)    PHYSICAL EXAM:  GENERAL: NAD, well-groomed, well-developed, obese  HEAD:  Atraumatic, Normocephalic  EYES: EOMI, PERRLA, conjunctiva and sclera clear  ENMT: No tonsillar erythema, exudates, or enlargement; Moist mucous membranes, Good dentition, No lesions  NECK: Supple, No JVD, Normal thyroid  NERVOUS SYSTEM:  Alert & Oriented X3, Good concentration; Motor Strength 5/5 B/L upper and lower extremities; DTRs 2+ intact and symmetric  CHEST/LUNG: Clear to percussion bilaterally; No rales, rhonchi, wheezing, or rubs  HEART: Regular rate and rhythm; No murmurs, rubs, or gallops  ABDOMEN: Soft, Nontender, Nondistended; Bowel sounds present  EXTREMITIES:  R knee with cooler on place  LYMPH: No lymphadenopathy noted  SKIN: No rashes or lesions    Consultant(s) Notes Reviewed:  [x ] YES  [ ] NO  Care Discussed with Consultants/Other Providers [ x] YES  [ ] NO    LABS:                                   9.6    11.37 )-----------( 249      ( 07 Aug 2020 08:18 )             31.8   08-07    142  |  106  |  11  ----------------------------<  101<H>  4.6   |  29  |  0.70    Ca    8.9      07 Aug 2020 08:18              CAPILLARY BLOOD GLUCOSE            RADIOLOGY & ADDITIONAL TESTS:

## 2020-08-07 NOTE — PROGRESS NOTE ADULT - ASSESSMENT
54 year old female with,    # S/p R TKA: doing well. Care per Ortho. C/w pain meds/bowel regimen. Encouraged IS use. C/w DVT PPx w/ ASA. PT follow up --> HPT.    # HTN: was on losartan before but taken off recently due to improvement on BP readings. Monitor BP for now.    # LE edema: likely chronic venous stasis disease - had extensive d/w patient regarding reducing her risk factors for this including weight loss; no need to c/w Lasix on d/c    #Anemia: Normocytic, in setting of ACD as well as procedure    #Leukocytosis: WBC of 11, likely reactive     # Eczema: c/w steroid cream upon discharge    # BMI of 38.7 --> Obesity, counseling performed    Anticipated discharge home today

## 2020-08-10 PROBLEM — T78.40XA ALLERGY, UNSPECIFIED, INITIAL ENCOUNTER: Chronic | Status: ACTIVE | Noted: 2020-08-04

## 2020-08-12 DIAGNOSIS — E78.5 HYPERLIPIDEMIA, UNSPECIFIED: ICD-10-CM

## 2020-08-12 DIAGNOSIS — R60.0 LOCALIZED EDEMA: ICD-10-CM

## 2020-08-12 DIAGNOSIS — I95.9 HYPOTENSION, UNSPECIFIED: ICD-10-CM

## 2020-08-12 DIAGNOSIS — D50.9 IRON DEFICIENCY ANEMIA, UNSPECIFIED: ICD-10-CM

## 2020-08-12 DIAGNOSIS — I10 ESSENTIAL (PRIMARY) HYPERTENSION: ICD-10-CM

## 2020-08-12 DIAGNOSIS — Z98.84 BARIATRIC SURGERY STATUS: ICD-10-CM

## 2020-08-12 DIAGNOSIS — L30.9 DERMATITIS, UNSPECIFIED: ICD-10-CM

## 2020-08-12 DIAGNOSIS — E66.9 OBESITY, UNSPECIFIED: ICD-10-CM

## 2020-08-12 DIAGNOSIS — M17.11 UNILATERAL PRIMARY OSTEOARTHRITIS, RIGHT KNEE: ICD-10-CM

## 2020-08-12 DIAGNOSIS — E55.9 VITAMIN D DEFICIENCY, UNSPECIFIED: ICD-10-CM

## 2020-08-14 ENCOUNTER — APPOINTMENT (OUTPATIENT)
Dept: ORTHOPEDIC SURGERY | Facility: CLINIC | Age: 55
End: 2020-08-14
Payer: OTHER MISCELLANEOUS

## 2020-08-14 VITALS — TEMPERATURE: 94.6 F

## 2020-08-14 PROCEDURE — 99024 POSTOP FOLLOW-UP VISIT: CPT

## 2020-08-14 PROCEDURE — 73562 X-RAY EXAM OF KNEE 3: CPT | Mod: 50

## 2020-08-14 RX ORDER — OXYCODONE HYDROCHLORIDE 10 MG/1
10 TABLET, FILM COATED, EXTENDED RELEASE ORAL
Qty: 14 | Refills: 0 | Status: ACTIVE | COMMUNITY
Start: 2020-08-14 | End: 1900-01-01

## 2020-08-14 NOTE — REASON FOR VISIT
[Post Operative Visit] : a post operative visit for [FreeTextEntry2] : S/P RIGHT KNEE REPLACEMENT 08/05/20 1ST VISIT

## 2020-08-14 NOTE — DISCUSSION/SUMMARY
[de-identified] : Patient was given a limited number of OxyContin 10 sustained release tablets along with increasing her oxycodone to 7.5 mg. Patient was instructed to take these judiciouslyPatient resulted in outpatient physical therapy to one month

## 2020-08-14 NOTE — HISTORY OF PRESENT ILLNESS
[de-identified] : He is status post date 15 right knee replacement. She is ambulating with a walker she has significant amount of discomfort.

## 2020-08-14 NOTE — PHYSICAL EXAM
[de-identified] : Range of motion is 3- 105° wound is well-healed she is neurovascularly intact distally. [de-identified] : AP standing individual lateral and sunrise views were obtained showing a well-positioned [employer]-type knee.

## 2020-08-16 ENCOUNTER — FORM ENCOUNTER (OUTPATIENT)
Age: 55
End: 2020-08-16

## 2020-08-23 ENCOUNTER — FORM ENCOUNTER (OUTPATIENT)
Age: 55
End: 2020-08-23

## 2020-09-11 ENCOUNTER — APPOINTMENT (OUTPATIENT)
Dept: ORTHOPEDIC SURGERY | Facility: CLINIC | Age: 55
End: 2020-09-11
Payer: OTHER MISCELLANEOUS

## 2020-09-11 PROCEDURE — 99024 POSTOP FOLLOW-UP VISIT: CPT

## 2020-09-11 NOTE — PHYSICAL EXAM
[de-identified] : Right knee wound is well-healed range of motion is °. Neurovascular intact distally.

## 2020-09-11 NOTE — REASON FOR VISIT
[Post Operative Visit] : a post operative visit for [FreeTextEntry2] : S/P RIGHT KNEE REPLACEMENT 08/05/20 - POST OP

## 2020-09-11 NOTE — DISCUSSION/SUMMARY
[de-identified] : Patient will be fitted with a nighttime extension splint for the right side he used nightly for 6 weeks. She'll followup in 6 weeks for evaluation of her range of motion status.

## 2020-09-11 NOTE — HISTORY OF PRESENT ILLNESS
[de-identified] : Patient is 6 weeks status post right knee replacement. She is here for her 6 week followup check

## 2020-10-23 ENCOUNTER — APPOINTMENT (OUTPATIENT)
Dept: ORTHOPEDIC SURGERY | Facility: CLINIC | Age: 55
End: 2020-10-23
Payer: OTHER MISCELLANEOUS

## 2020-10-23 PROCEDURE — 99024 POSTOP FOLLOW-UP VISIT: CPT

## 2020-10-23 PROCEDURE — 99072 ADDL SUPL MATRL&STAF TM PHE: CPT

## 2020-10-23 NOTE — DISCUSSION/SUMMARY
[de-identified] : Pt will return in one months time to check progression of ROM with night time extension splint. Pt was also given a Out patient physical therapy script that she will begin. We also discussed with patient briefly possible manipulation of ROM does not progressive to being ideal.

## 2020-10-23 NOTE — HISTORY OF PRESENT ILLNESS
[de-identified] : Pt presents today about 11 weeks post of of right knee replacement. Pt is doing well and still feels still on some days. Pt is ambulating on her own and no longer on pain medication. Pt just received her night time extension splint and has been wearing it for 1 week now.

## 2020-10-29 ENCOUNTER — FORM ENCOUNTER (OUTPATIENT)
Age: 55
End: 2020-10-29

## 2020-11-10 ENCOUNTER — NON-APPOINTMENT (OUTPATIENT)
Age: 55
End: 2020-11-10

## 2020-11-10 VITALS — BODY MASS INDEX: 35.36 KG/M2 | WEIGHT: 220 LBS | HEIGHT: 66 IN

## 2020-11-11 ENCOUNTER — FORM ENCOUNTER (OUTPATIENT)
Age: 55
End: 2020-11-11

## 2020-11-24 ENCOUNTER — APPOINTMENT (OUTPATIENT)
Dept: SURGERY | Facility: CLINIC | Age: 55
End: 2020-11-24
Payer: MEDICARE

## 2020-11-24 VITALS
HEIGHT: 66 IN | DIASTOLIC BLOOD PRESSURE: 79 MMHG | TEMPERATURE: 97.3 F | OXYGEN SATURATION: 98 % | BODY MASS INDEX: 35.36 KG/M2 | WEIGHT: 220 LBS | SYSTOLIC BLOOD PRESSURE: 130 MMHG | HEART RATE: 69 BPM

## 2020-11-24 PROCEDURE — 99072 ADDL SUPL MATRL&STAF TM PHE: CPT

## 2020-11-24 PROCEDURE — 99213 OFFICE O/P EST LOW 20 MIN: CPT

## 2020-11-24 NOTE — END OF VISIT
[FreeTextEntry3] : All medical record entries made by the Scribe were at my, Dr. Lepe's, discretion and personally dictated by me on 11/24/2020. I have reviewed the chart and agree that the record accurately reflects my personal performance of the history, physical exam, assessment and plan. I have also personally directed, reviewed and agreed to the chart.

## 2020-11-24 NOTE — HISTORY OF PRESENT ILLNESS
[de-identified] : Pt is a 56 y/o F with PSHx of right total knee replacement 8/5/20 who is 10 months s/p VSG who presents today feeling well. Pt has lost ~60 lbs since her surgery. She reports eating a lot of vegetables and some protein shakes. She states that she feels food getting stuck in her throat occasionally. She also endorses some intermittent feelings of gas and discomfort in her abdomen, but not pain. She denies heartburn.\par

## 2020-11-24 NOTE — ASSESSMENT
[FreeTextEntry1] : Pt is a 54 y/o F with PSHx of right total knee replacement 8/5/20 who is 10 months s/p VSG who presents today feeling well with ~60 lbs weight loss. I advised pt to reduce protein shake intake and instead switch to natural protein with foods like fish, beans, yogurt and cheese. I informed her that her abdominal discomfort is most likely due to gas from the foods she's eating. Patient will meet our dietician to discuss nutritional counseling. She will follow up in January (2 months).\par

## 2020-12-18 ENCOUNTER — APPOINTMENT (OUTPATIENT)
Dept: ORTHOPEDIC SURGERY | Facility: CLINIC | Age: 55
End: 2020-12-18
Payer: OTHER MISCELLANEOUS

## 2020-12-18 VITALS — TEMPERATURE: 96.6 F

## 2020-12-18 PROCEDURE — 99072 ADDL SUPL MATRL&STAF TM PHE: CPT

## 2020-12-18 PROCEDURE — 99214 OFFICE O/P EST MOD 30 MIN: CPT

## 2020-12-18 NOTE — REASON FOR VISIT
[Follow-Up Visit] : a follow-up visit for [FreeTextEntry2] : RIGHT KNEE PAIN AND STIFFNESS - REPLACED ON 8/5/20

## 2020-12-18 NOTE — REASON FOR VISIT
[Post Operative Visit] : a post operative visit for [Follow-Up Visit] : a follow-up visit for [FreeTextEntry2] : RIGHT KNEE REP;LACED 08/05/20 - HERE TODAY FOR RECENT STIFFNESS AND MILD PAIN - STILL ATTENDING PT

## 2020-12-18 NOTE — PHYSICAL EXAM
[de-identified] : Right knee appears well healed patient and she will she will at moderate size. Range of motion is 0-110°. Neurovascularly intact distally with good quad tone. No evidence of instability. [de-identified] : AP standing individual lateral and sunrise views were obtained showing a well-positioned right knee replacement.

## 2020-12-18 NOTE — HISTORY OF PRESENT ILLNESS
[de-identified] : Patient is here with complaints of some mild stiffness of the right knee. Overall she's well and happy with her right knee replacement. This is done August 2020.

## 2020-12-18 NOTE — DISCUSSION/SUMMARY
[de-identified] : About her options patient would like to have more flexion of the right knee I indicated to her that she should be fairly functional with what she has consider a knee manipulation under anesthesia. The reasonable risks and benefits of manipulation were discussed.

## 2021-01-05 ENCOUNTER — APPOINTMENT (OUTPATIENT)
Dept: SURGERY | Facility: CLINIC | Age: 56
End: 2021-01-05

## 2021-08-18 NOTE — DISCHARGE NOTE NURSING/CASE MANAGEMENT/SOCIAL WORK - NSDCVIVACCINE_GEN_ALL_CORE_FT
Impression: Type 2 diabetes mellitus with ophthalmic complications Plan: Reviewed the presence of diabetic retinopathy. control DM to reduce the risks of progression. No Vaccines Administered.

## 2021-10-08 ENCOUNTER — APPOINTMENT (OUTPATIENT)
Dept: ORTHOPEDIC SURGERY | Facility: CLINIC | Age: 56
End: 2021-10-08
Payer: OTHER MISCELLANEOUS

## 2021-10-08 PROCEDURE — 73562 X-RAY EXAM OF KNEE 3: CPT | Mod: 50

## 2021-10-08 PROCEDURE — 99214 OFFICE O/P EST MOD 30 MIN: CPT

## 2021-10-08 PROCEDURE — 99072 ADDL SUPL MATRL&STAF TM PHE: CPT

## 2021-10-08 RX ORDER — CELECOXIB 200 MG/1
200 CAPSULE ORAL
Qty: 30 | Refills: 0 | Status: ACTIVE | COMMUNITY
Start: 2021-10-08 | End: 1900-01-01

## 2021-10-08 NOTE — DISCUSSION/SUMMARY
[de-identified] : We talked about her diagnosis and that this is caused by some scar tissue at the distal end of the quadriceps. We'll first try to reduce the symptoms were placed on Celebrex 200 mg p.o. b.i.d. for 6 day course and be taken thereafter as needed we touched upon potential arthroscopic debridement versus open if symptoms worsen in the future.

## 2021-10-08 NOTE — HISTORY OF PRESENT ILLNESS
[de-identified] : Patient is 14 months status post right knee replacement she did having some increasing snapping sensation in the anterior part of the knee especially getting out of a seated position. It is sometimes painful mostly asymptomatic.

## 2021-10-08 NOTE — PHYSICAL EXAM
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 Quad tone is good the knee is stable to AP stress varus valgus stress both in full extension and 90° of flexion. [de-identified] : AP standing individual lateral and sunrise views were obtained showing a well-positioned right knee replacement.No evidence of hardware loosening migration particular attention paid to the patella.

## 2021-10-08 NOTE — REASON FOR VISIT
[Follow-Up Visit] : a follow-up visit for [FreeTextEntry2] : RIGHT KNEE CLICKING - HAD KNEE REPLACED 8/05/20

## 2021-11-29 ENCOUNTER — APPOINTMENT (OUTPATIENT)
Dept: ORTHOPEDIC SURGERY | Facility: CLINIC | Age: 56
End: 2021-11-29
Payer: OTHER MISCELLANEOUS

## 2021-11-29 VITALS — HEIGHT: 66 IN | BODY MASS INDEX: 37.77 KG/M2 | WEIGHT: 235 LBS

## 2021-11-29 PROCEDURE — 99072 ADDL SUPL MATRL&STAF TM PHE: CPT

## 2021-11-29 PROCEDURE — 99214 OFFICE O/P EST MOD 30 MIN: CPT

## 2021-11-29 RX ORDER — CELECOXIB 200 MG/1
200 CAPSULE ORAL
Qty: 30 | Refills: 0 | Status: ACTIVE | COMMUNITY
Start: 2021-11-29 | End: 1900-01-01

## 2021-11-29 NOTE — HISTORY OF PRESENT ILLNESS
[de-identified] : Patient is here with continuation of right knee mild crepitus with extension. We talked in the past about the etiology of this numbness patellar clunk syndrome. 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

## 2021-11-29 NOTE — PHYSICAL EXAM
[de-identified] : Right knee exam today there is minimal crepitus with terminal extension. Range of motion 0-25° it is stable to AP stress varus valgus stress both in 90° of flexion and full extension. Quad tones. No effusion noted.

## 2021-11-29 NOTE — DISCUSSION/SUMMARY
[de-identified] : We discussed her options I told the patient that if she can live with this that would be the first course. We will try a course of Celebrex 200 mg p.o. b.i.d. for 6 day course and be taken thereafter as needed and see if that regimen reduce her discomfort. I talked about possible arthroscopic scar excision if needed in the future.

## 2021-12-22 RX ORDER — HYALURONATE SODIUM, STABILIZED 88 MG/4 ML
88 SYRINGE (ML) INTRAARTICULAR
Qty: 1 | Refills: 0 | Status: ACTIVE | OUTPATIENT
Start: 2021-12-22

## 2022-01-03 ENCOUNTER — APPOINTMENT (OUTPATIENT)
Dept: ORTHOPEDIC SURGERY | Facility: CLINIC | Age: 57
End: 2022-01-03
Payer: MEDICARE

## 2022-01-03 PROCEDURE — 99214 OFFICE O/P EST MOD 30 MIN: CPT | Mod: 25

## 2022-01-03 PROCEDURE — 20611 DRAIN/INJ JOINT/BURSA W/US: CPT | Mod: LT

## 2022-01-03 RX ORDER — MELOXICAM 15 MG/1
15 TABLET ORAL
Qty: 30 | Refills: 0 | Status: ACTIVE | COMMUNITY
Start: 2021-08-23

## 2022-01-03 RX ORDER — ATORVASTATIN CALCIUM 20 MG/1
20 TABLET, FILM COATED ORAL
Qty: 90 | Refills: 0 | Status: ACTIVE | COMMUNITY
Start: 2021-09-30

## 2022-01-03 RX ORDER — TRIAMCINOLONE ACETONIDE 5 MG/G
0.5 OINTMENT TOPICAL
Qty: 15 | Refills: 0 | Status: ACTIVE | COMMUNITY
Start: 2021-11-15

## 2022-01-03 RX ORDER — LEVOCETIRIZINE DIHYDROCHLORIDE 5 MG/1
5 TABLET ORAL
Qty: 90 | Refills: 0 | Status: ACTIVE | COMMUNITY
Start: 2021-11-15

## 2022-01-03 RX ORDER — HYDROCORTISONE 25 MG/ML
2.5 LOTION TOPICAL
Qty: 59 | Refills: 0 | Status: ACTIVE | COMMUNITY
Start: 2021-09-30

## 2022-01-03 NOTE — HISTORY OF PRESENT ILLNESS
[de-identified] : Patient is here for complaint continuing left knee pain goal she status post right knee replacement quite pleased with that result she does have some pain with stair climbing but it is improving she also notices the right knee has some mild terminal crepitus. She should discuss possible left knee replacement and a cortisone injection.

## 2022-01-03 NOTE — PHYSICAL EXAM
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 Quad tone is good the knee is stable to AP stress varus valgus stress both in full extension and 90° of flexion.\par \par As far as left knee is concerned patient is definite medial joint line tenderness mild warmth minimal swelling no obvious effusion. Range of motion 0-120.

## 2022-01-03 NOTE — DISCUSSION/SUMMARY
[de-identified] : Patient will consider knee replacement surgery she understands and needs to be loose one month's time between the current injection and operation for additional follow up as needed.

## 2022-01-03 NOTE — PROCEDURE
[de-identified] : Patient was given a cortisone injection (Lidocaine 1% 4 cc + 10 mg of Kenalog) in the lateral compartment of the left knee. Injection is performed under sterile conditions with ultrasound guidance. Patient tolerated procedure well. If patient has a history of insulin- dependant diabetes they were informed of possible increase of blood glucose levels and instructed to adjust insulin accordingly.\par \par

## 2022-01-03 NOTE — REASON FOR VISIT
[Follow-Up Visit] : a follow-up visit for [FreeTextEntry2] : LEFT KNEE PAIN - WOULD LIKE CORTISONE AND DISCUSS REPLACEMENT

## 2022-01-28 ENCOUNTER — APPOINTMENT (OUTPATIENT)
Dept: ORTHOPEDIC SURGERY | Facility: CLINIC | Age: 57
End: 2022-01-28

## 2022-02-07 ENCOUNTER — APPOINTMENT (OUTPATIENT)
Dept: ORTHOPEDIC SURGERY | Facility: CLINIC | Age: 57
End: 2022-02-07
Payer: OTHER MISCELLANEOUS

## 2022-02-07 PROCEDURE — 99072 ADDL SUPL MATRL&STAF TM PHE: CPT

## 2022-02-07 PROCEDURE — 99213 OFFICE O/P EST LOW 20 MIN: CPT

## 2022-02-07 NOTE — REASON FOR VISIT
[Workers' Comp: Date of Injury: _______] : This visit is related to worker's compensation. Date of Injury: [unfilled] [FreeTextEntry2] : right knee replaced- continued pain, stifffness and swelling - left knee due for replacement on 04/23/22

## 2022-02-07 NOTE — PHYSICAL EXAM
[de-identified] : Right knee wound is well-healed range of motion 0-125°. Patient does have moderate quadriceps atrophy at the right compared to the left no obvious instability in full extension to varus valgus stress and 90° of flexion. There is definite crepitus with palpation of the distal quadriceps as well as patella tendon. No obvious effusion noted. Mild warmth.

## 2022-02-07 NOTE — DISCUSSION/SUMMARY
[de-identified] : Patient continues to have difficulty with her right knee. Her motion is improved however she continues to have quadriceps weakness. We have shown her specific exercises do for him to help improve her quadriceps tone and intern provide greater stability. Patient is unable to work at this time.

## 2022-02-16 ENCOUNTER — FORM ENCOUNTER (OUTPATIENT)
Age: 57
End: 2022-02-16

## 2022-02-23 ENCOUNTER — APPOINTMENT (OUTPATIENT)
Dept: ORTHOPEDIC SURGERY | Facility: CLINIC | Age: 57
End: 2022-02-23
Payer: OTHER GOVERNMENT

## 2022-03-03 ENCOUNTER — APPOINTMENT (OUTPATIENT)
Dept: ORTHOPEDIC SURGERY | Facility: CLINIC | Age: 57
End: 2022-03-03
Payer: OTHER GOVERNMENT

## 2022-03-03 VITALS — WEIGHT: 235 LBS | BODY MASS INDEX: 37.77 KG/M2 | HEIGHT: 66 IN

## 2022-03-03 DIAGNOSIS — M25.572 PAIN IN LEFT ANKLE AND JOINTS OF LEFT FOOT: ICD-10-CM

## 2022-03-03 PROCEDURE — 99072 ADDL SUPL MATRL&STAF TM PHE: CPT

## 2022-03-03 PROCEDURE — 73610 X-RAY EXAM OF ANKLE: CPT | Mod: LT

## 2022-03-03 PROCEDURE — 99213 OFFICE O/P EST LOW 20 MIN: CPT

## 2022-03-03 NOTE — HISTORY OF PRESENT ILLNESS
[de-identified] : This patient is a 56-year-old female who edges to have injured her left ankle in a work-related episode on January 6, 2012. She is never done any treatment for her she says she's had some evaluation clinically by her primary care physician. It bothers her the most when she gets out of bed or after sitting and getting up. She feels better with supportive shoes and her wants it to have arch support. She has a problem with both knees and uses a cane.

## 2022-03-03 NOTE — PHYSICAL EXAM
[de-identified] : The foot and ankle shows no obvious warmth. No obvious swelling. She has some tenderness laterally as well as medially along the ankle appears his pain along the plantar fascia range of motion the ankle is preserved no instability she does have flat pronated foot. Neurovascular exam is normal. [de-identified] : Left ankle x-ray shows no evidence of acute fracture, dislocation or spur formation

## 2022-03-03 NOTE — DISCUSSION/SUMMARY
[Medication Risks Reviewed] : Medication risks reviewed [de-identified] : She's had pain for 10 years at this point. I think it's worth getting an MRI of her left ankle. In the meanwhile she will wear good supportive shoes and once with arch support. I think she may have a mechanical problem to 2 to flatfoot deformity. Followup will be by telephone after I have reviewed the MRI results. She is going to begin some physical therapy in the interim.

## 2022-03-20 ENCOUNTER — FORM ENCOUNTER (OUTPATIENT)
Age: 57
End: 2022-03-20

## 2022-04-01 ENCOUNTER — APPOINTMENT (OUTPATIENT)
Dept: ORTHOPEDIC SURGERY | Facility: CLINIC | Age: 57
End: 2022-04-01

## 2022-04-01 ENCOUNTER — APPOINTMENT (OUTPATIENT)
Dept: ORTHOPEDIC SURGERY | Facility: CLINIC | Age: 57
End: 2022-04-01
Payer: MEDICARE

## 2022-04-01 PROCEDURE — XXXXX: CPT | Mod: 1L

## 2022-04-01 PROCEDURE — 20610 DRAIN/INJ JOINT/BURSA W/O US: CPT | Mod: LT

## 2022-04-01 NOTE — PHYSICAL EXAM
[de-identified] : Full replacement schedule loss of use\par 35% for knee replacement\par Good flexion from 0-115 degrees\par 14 % of laxity with 9 mm AP stress --- adds 5 %\par 1.5 inches of atrophy of the quad -- adds 5 percent\par \par 45% scheduled loss total \par \par

## 2022-04-01 NOTE — HISTORY OF PRESENT ILLNESS
[de-identified] : patient is here for schedule loss of us for her right knee s/p replacement -- workers comp case. She is still experiencing some pain in the knee when walking down stairs, getting up from seated positions.

## 2022-04-01 NOTE — REASON FOR VISIT
[Initial Visit] : an initial visit for [FreeTextEntry2] : right knee replacement - workers comp - scheduled loss

## 2022-04-07 ENCOUNTER — APPOINTMENT (OUTPATIENT)
Dept: ORTHOPEDIC SURGERY | Facility: CLINIC | Age: 57
End: 2022-04-07

## 2022-06-01 NOTE — PATIENT PROFILE ADULT - FUNCTIONAL SCREEN CURRENT LEVEL: EATING, MLM
----- Message from Rafael Fraser MD sent at 6/1/2022  2:50 PM CDT -----  Please call the infusion center and let them abida needs to have a transfusion of PRBC tomorrow.    Please let hem know that he will now be going to [facility] weekly for labs including a cbc and type and cross.  The therapy plan will indicate that he is to receive 1 unit PRBC each time his hemoglobin is less than 7.5     0 = independent

## 2022-06-23 NOTE — PHYSICAL EXAM
[de-identified] : \par left knee exam today there is minimal crepitus with terminal extension. Range of motion 0-120° it is stable to AP stress varus valgus stress both in 90° of flexion and full extension. Quad tones. No effusion noted. \par

## 2022-06-23 NOTE — DISCUSSION/SUMMARY
[de-identified] : patient will return as needed for left knee pain patient will ice the knee here in the office again tonight if symptomatic she will monitor her symptoms follow-up as needed.
0

## 2022-06-23 NOTE — PROCEDURE
[de-identified] : \par \par Patient was given a left knee Monovisc injection today.  This was done under sterile conditions and ultrasound guidance to the lateral compartment of the knee.  Patient tolerated the procedure well.

## 2022-08-08 ENCOUNTER — APPOINTMENT (OUTPATIENT)
Dept: ORTHOPEDIC SURGERY | Facility: CLINIC | Age: 57
End: 2022-08-08

## 2022-08-08 VITALS — BODY MASS INDEX: 36.96 KG/M2 | HEIGHT: 66 IN | WEIGHT: 230 LBS

## 2022-08-08 PROCEDURE — 99214 OFFICE O/P EST MOD 30 MIN: CPT

## 2022-08-08 PROCEDURE — 73562 X-RAY EXAM OF KNEE 3: CPT | Mod: 50

## 2022-08-08 PROCEDURE — 99072 ADDL SUPL MATRL&STAF TM PHE: CPT

## 2022-08-08 RX ORDER — CELECOXIB 200 MG/1
200 CAPSULE ORAL
Qty: 30 | Refills: 3 | Status: ACTIVE | COMMUNITY
Start: 2022-08-08 | End: 1900-01-01

## 2022-08-08 RX ORDER — HYLAN G-F 20 16MG/2ML
48 SYRINGE (ML) INTRAARTICULAR
Qty: 6 | Refills: 0 | Status: ACTIVE | COMMUNITY
Start: 2022-02-24

## 2022-08-08 RX ORDER — MUPIROCIN 20 MG/G
2 OINTMENT TOPICAL
Qty: 15 | Refills: 0 | Status: ACTIVE | COMMUNITY
Start: 2022-06-21

## 2022-08-08 NOTE — HISTORY OF PRESENT ILLNESS
[de-identified] : Patient presents today with continued right knee mild intermittent pain she notices a crunching sensation especially if she extends her knee.  She is really here however to discuss left knee replacement surgery she has failed conservative management and would like to move forward with knee replacement surgery in the short-term.

## 2022-08-08 NOTE — REASON FOR VISIT
[Follow-Up Visit] : a follow-up visit for [Knee Pain] : knee pain [FreeTextEntry2] : Rt knee pain. Pt is feeling a lot of pain today. had knee replaced in 2020. no injury or trauma

## 2022-08-08 NOTE — PHYSICAL EXAM
[de-identified] : Right knee on exam today patient has some mild to minimal crepitus with terminal extension.  There is no warmth about the right knee range of motion 0 to 125 degrees knee stable to AP stress varus valgus stress.\par \par Left knee has definite medial joint line tenderness mild varus inclination range of motion 0 to 120 degrees.  Soft tissue swelling is noted warmth but no obvious effusion. [de-identified] : Knee x-rays were ordered today.  AP standing individual lateral and sunrise views were obtained showing a well-positioned Smith & Nephew type right knee prosthesis\par \par Left knee has complete loss of cartilage in the medial aspect of the knee with mild varus inclination.  Early secondary findings such as osteophyte and cyst formation also noted.

## 2022-08-08 NOTE — DISCUSSION/SUMMARY
[Medication Risks Reviewed] : Medication risks reviewed [Surgical risks reviewed] : Surgical risks reviewed [de-identified] : Surgical risks reviewed. The reasonable risks and benefits of knee replacement surgery discussed in detail with the patient. Patient asked appropriate questions which were answered to their satisfaction. We talked about potential complications including complications they may require revision surgery such as component loosening failure migration wear and also potential complications of infection and stiffness.\par \par We also talked at length about typical convalescence expectations implant selection and surgical approach.\par \par As far as the right knee is concerned I indicated to the patient that we should try a 6-day course of an anti-inflammatory such as Celebrex the reasonable risk and benefits of medication were discussed in detail.  There are no contraindications to current medications.  Patient will be placed on 2 mg p.o. twice daily for 6-day course then to be taken thereafter as needed she also knows to discontinue the medication 10 days prior to surgery.\par \par

## 2022-11-07 ENCOUNTER — APPOINTMENT (OUTPATIENT)
Dept: ORTHOPEDIC SURGERY | Facility: CLINIC | Age: 57
End: 2022-11-07

## 2022-11-07 PROCEDURE — 99072 ADDL SUPL MATRL&STAF TM PHE: CPT

## 2022-11-07 PROCEDURE — 99214 OFFICE O/P EST MOD 30 MIN: CPT

## 2022-11-07 RX ORDER — CELECOXIB 200 MG/1
200 CAPSULE ORAL
Qty: 30 | Refills: 3 | Status: ACTIVE | COMMUNITY
Start: 2022-11-07 | End: 1900-01-01

## 2022-11-07 NOTE — DISCUSSION/SUMMARY
[Medication Risks Reviewed] : Medication risks reviewed [de-identified] : Patient I had a discussion about the underlying etiology of her left medial knee pain.  Patient has established diagnosis of severe osteoarthritis of the left knee and is tentatively scheduled in 2 months for knee replacement surgery.  We withdrew the option of a cortisone injection today because he would like to keep 3 months timeframe in between any injections and surgery and this would violate our guidelines.  Patient I talked about other options with patient on Celebrex 20 mg p.o. twice daily for 6-day course then to be taken thereafter as needed.  Reasonable risk and benefits of medication were discussed in detail as well as potential side effects.  We reviewed the patient's current medications there seems to be no contraindication to its intermittent use.\par \par Patient will follow-up 2 weeks before surgery\par Radiographs will be ordered at that point.\par \par Plan.  Patient was placed on Celebrex as stated above follow-up as needed.\par \par due to patients disability patient is in need of a parking permit.\par there is no need for MRI, her diagnosis is confirmed on x ray which will be attached to application.

## 2022-11-07 NOTE — PHYSICAL EXAM
[de-identified] : \par Left knee has definite medial joint line tenderness mild varus inclination range of motion 0 to 120 degrees.  Soft tissue swelling is noted warmth but no obvious effusion. [de-identified] : Knee x-rays were reviewed from previous visit patient has bone-on-bone contact the medial aspect of the left knee with secondary findings of osteophyte and cyst formation mild varus pronation also noted.

## 2022-11-08 ENCOUNTER — APPOINTMENT (OUTPATIENT)
Dept: ORTHOPEDIC SURGERY | Facility: CLINIC | Age: 57
End: 2022-11-08

## 2022-12-19 ENCOUNTER — APPOINTMENT (OUTPATIENT)
Dept: ORTHOPEDIC SURGERY | Facility: CLINIC | Age: 57
End: 2022-12-19

## 2022-12-19 VITALS — HEIGHT: 66 IN | WEIGHT: 248 LBS | BODY MASS INDEX: 39.86 KG/M2

## 2022-12-19 PROCEDURE — 99072 ADDL SUPL MATRL&STAF TM PHE: CPT

## 2022-12-19 PROCEDURE — 99215 OFFICE O/P EST HI 40 MIN: CPT

## 2022-12-19 RX ORDER — GABAPENTIN 300 MG/1
300 CAPSULE ORAL
Qty: 90 | Refills: 0 | Status: ACTIVE | COMMUNITY
Start: 2022-11-03

## 2022-12-19 RX ORDER — MOMETASONE FUROATE 1 MG/ML
0.1 SOLUTION TOPICAL
Qty: 60 | Refills: 0 | Status: ACTIVE | COMMUNITY
Start: 2022-12-08

## 2022-12-19 RX ORDER — METRONIDAZOLE 7.5 MG/G
0.75 GEL VAGINAL
Qty: 70 | Refills: 0 | Status: ACTIVE | COMMUNITY
Start: 2022-09-27

## 2022-12-19 NOTE — PHYSICAL EXAM
[de-identified] : \par Left knee has definite medial joint line tenderness mild varus inclination range of motion 0 to 130 degrees.  Soft tissue swelling is noted warmth but no obvious effusion.

## 2022-12-19 NOTE — DISCUSSION/SUMMARY
[Surgical risks reviewed] : Surgical risks reviewed [de-identified] : Surgical risks reviewed. The reasonable risks and benefits of knee replacement surgery discussed in detail with the patient. Patient asked appropriate questions which were answered to their satisfaction. We talked about potential complications including complications they may require revision surgery such as component loosening failure migration wear and also potential complications of infection and stiffness.\par \par We also talked at length about typical convalescence expectations, surgical approach, implant selection criteria, and typical expectations for implant longevity.\par \par We spent approximately 45 minutes on today's consultation.

## 2022-12-19 NOTE — REASON FOR VISIT
[Follow-Up Visit] : a follow-up visit for [Knee Pain] : knee pain [FreeTextEntry2] : Lt knee pre op visit. Surgery is 01/04/22

## 2022-12-19 NOTE — HISTORY OF PRESENT ILLNESS
[de-identified] : Ms. Lemus is here for preop visit she is scheduled right after the new year to undergo left knee replacement.

## 2023-02-13 ENCOUNTER — APPOINTMENT (OUTPATIENT)
Dept: ORTHOPEDIC SURGERY | Facility: CLINIC | Age: 58
End: 2023-02-13
Payer: OTHER MISCELLANEOUS

## 2023-02-13 PROCEDURE — 99072 ADDL SUPL MATRL&STAF TM PHE: CPT

## 2023-02-13 PROCEDURE — 99215 OFFICE O/P EST HI 40 MIN: CPT

## 2023-02-13 PROCEDURE — 73562 X-RAY EXAM OF KNEE 3: CPT | Mod: 50

## 2023-02-13 NOTE — REASON FOR VISIT
[Follow-Up Visit] : a follow-up visit for [Workers' Comp: Date of Injury: _____] : This visit is related to worker's compensation. Date of Injury: [unfilled] [Knee Pain] : knee pain [FreeTextEntry2] : follow up for left knee.

## 2023-02-13 NOTE — DISCUSSION/SUMMARY
[Surgical risks reviewed] : Surgical risks reviewed [de-identified] : Patient I discussed at length the underlying etiology of her left knee pain.  Patient has a confirmed diagnosis of left knee osteoarthritis severe in the medial compartment with varus inclination.  We talked about her options patient is no longer seeing any sustained symptomatic improvement with conservative measures.  We talked about the need to consider knee replacement surgery at this point.  Reason risk and benefits of procedure were discussed in detail including typical convalescence expectations, surgical approach, implant selection criteria as well as typical expectations for implant longevity.\par \par Surgical risks reviewed. The reasonable risks and benefits of knee replacement surgery discussed in detail with the patient. Patient asked appropriate questions which were answered to their satisfaction. We talked about potential complications including complications they may require revision surgery such as component loosening failure migration wear and also potential complications of infection and stiffness.\par \par Patient is interested in moving forward with the proposed operation.  We will try to accommodate her pending medical clearance.\par \par Today's consultation lasted 45 minutes.

## 2023-02-13 NOTE — HISTORY OF PRESENT ILLNESS
[de-identified] : Patient returns today she is continue plan of worsening left knee pain recall patient status post right knee replacement surgery patient is here to discuss treatment options she is no longer improving with conservative measures such as ice anti-inflammatories and physical therapy.  Patient is here to discuss knee replacement surgery.

## 2023-02-13 NOTE — PHYSICAL EXAM
[de-identified] :  Left knee has definite medial joint line tenderness mild varus inclination range of motion 0 to 130 degrees.  Soft tissue swelling is noted warmth but no obvious effusion.  Knee stable to AP stress varus valgus stress in both full extension and 90 degrees flexion. [de-identified] : Knee radiographs were ordered today.  AP standing individual lateral and sunrise views were obtained showing a well-positioned Smith & Nephew Torri type right knee prosthesis.  Left knee shows severe narrowing of medial compartment with complete cartilage loss in the medial aspect of the AP projection of the left knee.  Patient is also showing some mild varus inclination and early secondary findings of osteophyte and cyst formation.

## 2023-05-15 RX ORDER — POVIDONE-IODINE 5 %
1 AEROSOL (ML) TOPICAL ONCE
Refills: 0 | Status: COMPLETED | OUTPATIENT
Start: 2023-05-17 | End: 2023-05-17

## 2023-05-15 NOTE — H&P ADULT - PROBLEM SELECTOR PLAN 1
Admit to Orthopaedic Service.  Presents today for elective left total knee replacement.  Pt medically stable and cleared for procedure today by Dr. Rehman

## 2023-05-15 NOTE — H&P ADULT - NSHPPHYSICALEXAM_GEN_ALL_CORE
MSK: Decreased left knee ROM secondary to pain    Rest of PE per MD clearance General: NAD  MSK: left le skin intact, no erythema/ecchymosis/sts. SLT INTACT, DP/PT 2+, EHL/TA/GS 5/5. Decreased left knee ROM secondary to pain    Rest of PE per MD clearance

## 2023-05-15 NOTE — H&P ADULT - HISTORY OF PRESENT ILLNESS
56yo F with left knee pain x     Presents today for elective left total knee arthroplasty with Dr. Nieto. 58yo F with left knee pain x 2013. Pt. had a work injury in which she slipped and fell down a ladder coming down off the boiler. Pt. landed on both knees with radiation of pain to her feet and toes. Pt. uses cane and walker off/on since 2017. In 2020 patient had right tkr done. Pt. denies any falls, but left knee will "give out" on her. Pt had left knee scope done in 2018 and has tried conservative treatments including gel injections. Denies any numbness/tingling in b/l les.   Presents today for elective left total knee arthroplasty with Dr. Nieto.

## 2023-05-15 NOTE — H&P ADULT - NSHPLABSRESULTS_GEN_ALL_CORE
Preop CBC, BMP, PT/INR, PTT within normal range and reviewed per medical clearance  Cr- 0.6  A1c: 5.7  UA: trace ketones, reviewed per medical clearance  Preop EKG within normal limits and reviewed per medical clearance  3M: DOS

## 2023-05-15 NOTE — H&P ADULT - NSICDXPASTSURGICALHX_GEN_ALL_CORE_FT
PAST SURGICAL HISTORY:  Fibroids, intramural novosure    H/O knee surgery left scope    History of sleeve gastrectomy

## 2023-05-16 ENCOUNTER — TRANSCRIPTION ENCOUNTER (OUTPATIENT)
Age: 58
End: 2023-05-16

## 2023-05-16 NOTE — ASU PATIENT PROFILE, ADULT - FALL HARM RISK - UNIVERSAL INTERVENTIONS
Bed in lowest position, wheels locked, appropriate side rails in place/Call bell, personal items and telephone in reach/Instruct patient to call for assistance before getting out of bed or chair/Non-slip footwear when patient is out of bed/[place] to call system/Physically safe environment - no spills, clutter or unnecessary equipment/Purposeful Proactive Rounding/Room/bathroom lighting operational, light cord in reach

## 2023-05-17 ENCOUNTER — INPATIENT (INPATIENT)
Facility: HOSPITAL | Age: 58
LOS: 0 days | Discharge: HOME HEALTH | DRG: 470 | End: 2023-05-18
Attending: SPECIALIST | Admitting: SPECIALIST
Payer: OTHER MISCELLANEOUS

## 2023-05-17 ENCOUNTER — RESULT REVIEW (OUTPATIENT)
Age: 58
End: 2023-05-17

## 2023-05-17 ENCOUNTER — APPOINTMENT (OUTPATIENT)
Dept: ORTHOPEDIC SURGERY | Facility: HOSPITAL | Age: 58
End: 2023-05-17
Payer: MEDICARE

## 2023-05-17 VITALS
OXYGEN SATURATION: 99 % | WEIGHT: 250.89 LBS | HEIGHT: 66 IN | HEART RATE: 93 BPM | TEMPERATURE: 98 F | DIASTOLIC BLOOD PRESSURE: 81 MMHG | RESPIRATION RATE: 16 BRPM | SYSTOLIC BLOOD PRESSURE: 156 MMHG

## 2023-05-17 DIAGNOSIS — I10 ESSENTIAL (PRIMARY) HYPERTENSION: ICD-10-CM

## 2023-05-17 DIAGNOSIS — M17.12 UNILATERAL PRIMARY OSTEOARTHRITIS, LEFT KNEE: ICD-10-CM

## 2023-05-17 DIAGNOSIS — D25.1 INTRAMURAL LEIOMYOMA OF UTERUS: Chronic | ICD-10-CM

## 2023-05-17 DIAGNOSIS — E61.1 IRON DEFICIENCY: ICD-10-CM

## 2023-05-17 DIAGNOSIS — Z90.3 ACQUIRED ABSENCE OF STOMACH [PART OF]: Chronic | ICD-10-CM

## 2023-05-17 DIAGNOSIS — Z98.890 OTHER SPECIFIED POSTPROCEDURAL STATES: Chronic | ICD-10-CM

## 2023-05-17 DIAGNOSIS — E78.00 PURE HYPERCHOLESTEROLEMIA, UNSPECIFIED: ICD-10-CM

## 2023-05-17 PROCEDURE — 27447 TOTAL KNEE ARTHROPLASTY: CPT | Mod: LT

## 2023-05-17 PROCEDURE — 73560 X-RAY EXAM OF KNEE 1 OR 2: CPT | Mod: 26,LT

## 2023-05-17 PROCEDURE — 27447 TOTAL KNEE ARTHROPLASTY: CPT | Mod: AS,LT

## 2023-05-17 DEVICE — FEM COMP JRNY II BCS BICRUCIATE LT SZ 4: Type: IMPLANTABLE DEVICE | Site: LEFT | Status: FUNCTIONAL

## 2023-05-17 DEVICE — PATELLA 7.5 32MM: Type: IMPLANTABLE DEVICE | Site: LEFT | Status: FUNCTIONAL

## 2023-05-17 DEVICE — IMPLANTABLE DEVICE: Type: IMPLANTABLE DEVICE | Site: LEFT | Status: FUNCTIONAL

## 2023-05-17 DEVICE — BASEPLATE TIB JRNY NP SZ 4 LT: Type: IMPLANTABLE DEVICE | Site: LEFT | Status: FUNCTIONAL

## 2023-05-17 DEVICE — CEMENT PALACOS R: Type: IMPLANTABLE DEVICE | Site: LEFT | Status: FUNCTIONAL

## 2023-05-17 DEVICE — PIN RIMMED SPEED 45MM: Type: IMPLANTABLE DEVICE | Site: LEFT | Status: FUNCTIONAL

## 2023-05-17 DEVICE — PIN TROCAR GEN 1/8 X 3IN: Type: IMPLANTABLE DEVICE | Site: LEFT | Status: FUNCTIONAL

## 2023-05-17 RX ORDER — CELECOXIB 200 MG/1
400 CAPSULE ORAL ONCE
Refills: 0 | Status: COMPLETED | OUTPATIENT
Start: 2023-05-17 | End: 2023-05-17

## 2023-05-17 RX ORDER — OXYCODONE HYDROCHLORIDE 5 MG/1
5 TABLET ORAL
Refills: 0 | Status: DISCONTINUED | OUTPATIENT
Start: 2023-05-17 | End: 2023-05-18

## 2023-05-17 RX ORDER — HYDROMORPHONE HYDROCHLORIDE 2 MG/ML
0.5 INJECTION INTRAMUSCULAR; INTRAVENOUS; SUBCUTANEOUS ONCE
Refills: 0 | Status: DISCONTINUED | OUTPATIENT
Start: 2023-05-17 | End: 2023-05-18

## 2023-05-17 RX ORDER — SENNA PLUS 8.6 MG/1
2 TABLET ORAL AT BEDTIME
Refills: 0 | Status: DISCONTINUED | OUTPATIENT
Start: 2023-05-17 | End: 2023-05-18

## 2023-05-17 RX ORDER — OXYCODONE HYDROCHLORIDE 5 MG/1
20 TABLET ORAL ONCE
Refills: 0 | Status: DISCONTINUED | OUTPATIENT
Start: 2023-05-17 | End: 2023-05-17

## 2023-05-17 RX ORDER — ASPIRIN/CALCIUM CARB/MAGNESIUM 324 MG
325 TABLET ORAL DAILY
Refills: 0 | Status: DISCONTINUED | OUTPATIENT
Start: 2023-05-17 | End: 2023-05-18

## 2023-05-17 RX ORDER — CELECOXIB 200 MG/1
200 CAPSULE ORAL EVERY 12 HOURS
Refills: 0 | Status: DISCONTINUED | OUTPATIENT
Start: 2023-05-18 | End: 2023-05-18

## 2023-05-17 RX ORDER — CHLORHEXIDINE GLUCONATE 213 G/1000ML
1 SOLUTION TOPICAL ONCE
Refills: 0 | Status: COMPLETED | OUTPATIENT
Start: 2023-05-17 | End: 2023-05-17

## 2023-05-17 RX ORDER — CEFAZOLIN SODIUM 1 G
2000 VIAL (EA) INJECTION EVERY 8 HOURS
Refills: 0 | Status: COMPLETED | OUTPATIENT
Start: 2023-05-17 | End: 2023-05-18

## 2023-05-17 RX ORDER — MAGNESIUM HYDROXIDE 400 MG/1
30 TABLET, CHEWABLE ORAL DAILY
Refills: 0 | Status: DISCONTINUED | OUTPATIENT
Start: 2023-05-17 | End: 2023-05-18

## 2023-05-17 RX ORDER — TRAMADOL HYDROCHLORIDE 50 MG/1
50 TABLET ORAL EVERY 6 HOURS
Refills: 0 | Status: DISCONTINUED | OUTPATIENT
Start: 2023-05-17 | End: 2023-05-18

## 2023-05-17 RX ORDER — ACETAMINOPHEN 500 MG
650 TABLET ORAL EVERY 6 HOURS
Refills: 0 | Status: DISCONTINUED | OUTPATIENT
Start: 2023-05-17 | End: 2023-05-18

## 2023-05-17 RX ORDER — POLYETHYLENE GLYCOL 3350 17 G/17G
17 POWDER, FOR SOLUTION ORAL AT BEDTIME
Refills: 0 | Status: DISCONTINUED | OUTPATIENT
Start: 2023-05-17 | End: 2023-05-18

## 2023-05-17 RX ORDER — ONDANSETRON 8 MG/1
4 TABLET, FILM COATED ORAL EVERY 6 HOURS
Refills: 0 | Status: DISCONTINUED | OUTPATIENT
Start: 2023-05-17 | End: 2023-05-18

## 2023-05-17 RX ORDER — OXYCODONE HYDROCHLORIDE 5 MG/1
10 TABLET ORAL
Refills: 0 | Status: DISCONTINUED | OUTPATIENT
Start: 2023-05-17 | End: 2023-05-18

## 2023-05-17 RX ORDER — SODIUM CHLORIDE 9 MG/ML
1000 INJECTION, SOLUTION INTRAVENOUS
Refills: 0 | Status: DISCONTINUED | OUTPATIENT
Start: 2023-05-18 | End: 2023-05-18

## 2023-05-17 RX ADMIN — OXYCODONE HYDROCHLORIDE 10 MILLIGRAM(S): 5 TABLET ORAL at 22:35

## 2023-05-17 RX ADMIN — Medication 1 APPLICATION(S): at 10:00

## 2023-05-17 RX ADMIN — Medication 100 MILLIGRAM(S): at 23:10

## 2023-05-17 RX ADMIN — OXYCODONE HYDROCHLORIDE 20 MILLIGRAM(S): 5 TABLET ORAL at 12:48

## 2023-05-17 RX ADMIN — CELECOXIB 400 MILLIGRAM(S): 200 CAPSULE ORAL at 12:48

## 2023-05-17 RX ADMIN — OXYCODONE HYDROCHLORIDE 5 MILLIGRAM(S): 5 TABLET ORAL at 18:20

## 2023-05-17 RX ADMIN — CHLORHEXIDINE GLUCONATE 1 APPLICATION(S): 213 SOLUTION TOPICAL at 10:00

## 2023-05-17 RX ADMIN — Medication 650 MILLIGRAM(S): at 18:20

## 2023-05-17 RX ADMIN — OXYCODONE HYDROCHLORIDE 10 MILLIGRAM(S): 5 TABLET ORAL at 21:35

## 2023-05-17 RX ADMIN — POLYETHYLENE GLYCOL 3350 17 GRAM(S): 17 POWDER, FOR SOLUTION ORAL at 21:35

## 2023-05-17 RX ADMIN — SENNA PLUS 2 TABLET(S): 8.6 TABLET ORAL at 21:35

## 2023-05-17 NOTE — BRIEF OPERATIVE NOTE - VENOUS THROMBOEMBOLISM PROPHYLAXIS THERAPY
Name: Jan Valdes  (female B)  22 days old, CGA 35w0d  Birth: 2023 at 3:31 PM    Gestational Age: 31w6d, 3 lb 15.1 oz (1790 g)                                                               2021     Mat Hx: 27 yrs old, , GBS +,PPROM >3 days, C/S,Di-Di twin gestation     Last 3 weights:  Vitals:    21 1600 21 1600 21 1600   Weight: 2.16 kg (4 lb 12.2 oz) 2.195 kg (4 lb 13.4 oz) 2.245 kg (4 lb 15.2 oz)                                            Weight change: 0.05 kg (1.8 oz)   Vital signs (past 24 hours)   Temp:  [98.3  F (36.8  C)-99  F (37.2  C)] 98.5  F (36.9  C)  Pulse:  [160-188] 166  Resp:  [34-64] 48  BP: (82-85)/(46-71) 82/50  FiO2 (%):  [21 %] 21 %  SpO2:  [90 %-98 %] 96 %    Intake:   344   Output:  x 8   Stool:  x 2   Em/asp: x 0     Ml/kg/day     153   goal ml/kg   160   kcal/kg/day    123                   Lines/Tubes:OG    Diet: BM/DBM 24kcal with SHMF + LP (4) 45ml Q 3 hrs      FRS                PO 6%      LABS/RESULTS/MEDS PLAN   FEN:   Lab Results   Component Value Date    ALKPHOS 399 (H) 2021    VITDT 22 2021       Vitamin D 20 mcg daily (increased ) [x] Alk phos   [x] Vit D level      Resp: NC  lpm () for desats  (RA -)          A/B: SR deep desats                 Upper airway congestion noted this afternoon (likely related to reflux) - deep suctioned nasopharynx for thick mucus; SaO2 and breath sounds subsequently improved   CV: Murmur    ID: Date Cultures/Labs Treatment (# of days)   , Covid - screening NEG    1/15 bld cx     Covid screen Q Friday   Heme:   Lab Results   Component Value Date    HGB 2021    HGB 2021    TAMIR 169 2021      FeSO4 4 mg/kg  daily [x]Hgb    GI/  Jaundice:  resolved    Neuro: HUS:  nL [x] F/u HUS 2/15   Endo: NMS: 1.   normal    2.  normal   3.     Exam: General: Normally responsive to exam.  HEENT:  Anterior fontanel soft and flat, sutures  approx.  Resp:  Breath sounds clear and equal bilaterally. No increased work of breathing. Mild upper airway congestion, improved after suctioning.  CV:  RRR, soft murmur appreciated. Brisk capillary refill.  GI:  Abdomen soft and flat, audible bowel sounds.  Neuro: Tone symmetric and AGA.  Skin: Pink, warm, intact.    Parents update Parents updated by Dr. Edwards after rounds.    ROP/  HCM: Immunization History   Administered Date(s) Administered     Hep B, Peds or Adolescent 2021        CCHD passed 1/29    CST ____     Hearing ____       PCP: Anastacia Linder  [facility]   [address] [address]  Telephone [phone]  Fax [phone]     JENNIFER Garrett CNP 2021 1:53 PM   scd

## 2023-05-17 NOTE — PHYSICAL THERAPY INITIAL EVALUATION ADULT - GAIT DEVIATIONS NOTED, PT EVAL
patient with unsteady gait, no LOB, required verbal cues for weight-shifting/decreased ai/decreased step length

## 2023-05-17 NOTE — PHYSICAL THERAPY INITIAL EVALUATION ADULT - ADDITIONAL COMMENTS
Patient reports she lives in elevator apartment with no VICTORIANO. Patient's daughter will be staying with her upon d/c. Patient ambulates with cane/RW. Patient has tub shower.

## 2023-05-17 NOTE — PHYSICAL THERAPY INITIAL EVALUATION ADULT - GENERAL OBSERVATIONS, REHAB EVAL
Patient received semi-supine in NAD with +ECG +heplock +L knee dressing clean/dry/intact +DARIAN +SCD x 2

## 2023-05-17 NOTE — PHYSICAL THERAPY INITIAL EVALUATION ADULT - PERTINENT HX OF CURRENT PROBLEM, REHAB EVAL
Patient is a 57 year old female with left knee pain x 2013. Pt. had a work injury in which she slipped and fell down a ladder coming down off the boiler. Pt. landed on both knees with radiation of pain to her feet and toes. Pt. uses cane and walker off/on since 2017. In 2020 patient had right tkr done. Pt. denies any falls, but left knee will "give out" on her. Pt had left knee scope done in 2018 and has tried conservative treatments including gel injections

## 2023-05-18 ENCOUNTER — TRANSCRIPTION ENCOUNTER (OUTPATIENT)
Age: 58
End: 2023-05-18

## 2023-05-18 VITALS — OXYGEN SATURATION: 98 % | HEART RATE: 61 BPM | DIASTOLIC BLOOD PRESSURE: 64 MMHG | SYSTOLIC BLOOD PRESSURE: 111 MMHG

## 2023-05-18 LAB
ANION GAP SERPL CALC-SCNC: 4 MMOL/L — LOW (ref 5–17)
BUN SERPL-MCNC: 14 MG/DL — SIGNIFICANT CHANGE UP (ref 7–23)
CALCIUM SERPL-MCNC: 9.1 MG/DL — SIGNIFICANT CHANGE UP (ref 8.4–10.5)
CHLORIDE SERPL-SCNC: 106 MMOL/L — SIGNIFICANT CHANGE UP (ref 96–108)
CO2 SERPL-SCNC: 27 MMOL/L — SIGNIFICANT CHANGE UP (ref 22–31)
CREAT SERPL-MCNC: 0.58 MG/DL — SIGNIFICANT CHANGE UP (ref 0.5–1.3)
EGFR: 104 ML/MIN/1.73M2 — SIGNIFICANT CHANGE UP
GLUCOSE SERPL-MCNC: 138 MG/DL — HIGH (ref 70–99)
HCT VFR BLD CALC: 33.2 % — LOW (ref 34.5–45)
HGB BLD-MCNC: 10.3 G/DL — LOW (ref 11.5–15.5)
MCHC RBC-ENTMCNC: 24.8 PG — LOW (ref 27–34)
MCHC RBC-ENTMCNC: 31 GM/DL — LOW (ref 32–36)
MCV RBC AUTO: 79.8 FL — LOW (ref 80–100)
NRBC # BLD: 0 /100 WBCS — SIGNIFICANT CHANGE UP (ref 0–0)
PLATELET # BLD AUTO: 223 K/UL — SIGNIFICANT CHANGE UP (ref 150–400)
POTASSIUM SERPL-MCNC: 4.3 MMOL/L — SIGNIFICANT CHANGE UP (ref 3.5–5.3)
POTASSIUM SERPL-SCNC: 4.3 MMOL/L — SIGNIFICANT CHANGE UP (ref 3.5–5.3)
RBC # BLD: 4.16 M/UL — SIGNIFICANT CHANGE UP (ref 3.8–5.2)
RBC # FLD: 16.5 % — HIGH (ref 10.3–14.5)
SODIUM SERPL-SCNC: 137 MMOL/L — SIGNIFICANT CHANGE UP (ref 135–145)
WBC # BLD: 7.38 K/UL — SIGNIFICANT CHANGE UP (ref 3.8–10.5)
WBC # FLD AUTO: 7.38 K/UL — SIGNIFICANT CHANGE UP (ref 3.8–10.5)

## 2023-05-18 RX ORDER — OXYCODONE HYDROCHLORIDE 5 MG/1
1 TABLET ORAL
Qty: 30 | Refills: 0
Start: 2023-05-18

## 2023-05-18 RX ORDER — KETOTIFEN FUMARATE 0.34 MG/ML
1 SOLUTION OPHTHALMIC DAILY
Refills: 0 | Status: DISCONTINUED | OUTPATIENT
Start: 2023-05-18 | End: 2023-05-18

## 2023-05-18 RX ORDER — CELECOXIB 200 MG/1
1 CAPSULE ORAL
Qty: 28 | Refills: 0
Start: 2023-05-18 | End: 2023-05-31

## 2023-05-18 RX ORDER — ACETAMINOPHEN 500 MG
2 TABLET ORAL
Qty: 0 | Refills: 0 | DISCHARGE
Start: 2023-05-18

## 2023-05-18 RX ORDER — ASPIRIN/CALCIUM CARB/MAGNESIUM 324 MG
1 TABLET ORAL
Qty: 10 | Refills: 0
Start: 2023-05-18 | End: 2023-05-27

## 2023-05-18 RX ADMIN — Medication 650 MILLIGRAM(S): at 00:42

## 2023-05-18 RX ADMIN — Medication 325 MILLIGRAM(S): at 11:11

## 2023-05-18 RX ADMIN — OXYCODONE HYDROCHLORIDE 10 MILLIGRAM(S): 5 TABLET ORAL at 09:49

## 2023-05-18 RX ADMIN — SODIUM CHLORIDE 100 MILLILITER(S): 9 INJECTION, SOLUTION INTRAVENOUS at 00:28

## 2023-05-18 RX ADMIN — CELECOXIB 200 MILLIGRAM(S): 200 CAPSULE ORAL at 06:12

## 2023-05-18 RX ADMIN — OXYCODONE HYDROCHLORIDE 10 MILLIGRAM(S): 5 TABLET ORAL at 01:30

## 2023-05-18 RX ADMIN — Medication 100 MILLIGRAM(S): at 05:00

## 2023-05-18 RX ADMIN — Medication 650 MILLIGRAM(S): at 00:28

## 2023-05-18 RX ADMIN — OXYCODONE HYDROCHLORIDE 10 MILLIGRAM(S): 5 TABLET ORAL at 00:30

## 2023-05-18 RX ADMIN — CELECOXIB 200 MILLIGRAM(S): 200 CAPSULE ORAL at 05:12

## 2023-05-18 RX ADMIN — Medication 650 MILLIGRAM(S): at 11:10

## 2023-05-18 RX ADMIN — OXYCODONE HYDROCHLORIDE 10 MILLIGRAM(S): 5 TABLET ORAL at 05:26

## 2023-05-18 RX ADMIN — OXYCODONE HYDROCHLORIDE 10 MILLIGRAM(S): 5 TABLET ORAL at 04:26

## 2023-05-18 RX ADMIN — Medication 1 TABLET(S): at 11:11

## 2023-05-18 NOTE — PATIENT PROFILE ADULT - FALL HARM RISK - HARM RISK INTERVENTIONS

## 2023-05-18 NOTE — DISCHARGE NOTE NURSING/CASE MANAGEMENT/SOCIAL WORK - PATIENT PORTAL LINK FT
You can access the FollowMyHealth Patient Portal offered by [facility] by registering at the following website: [email or web address]. By joining [facility]’s FollowMyHealth portal, you will also be able to view your health information using other applications (apps) compatible with our system.

## 2023-05-18 NOTE — DISCHARGE NOTE PROVIDER - HOSPITAL COURSE
Admitted on 5/17/23  Attending: Dr. Nieto   Surgery: Left TKA   Iman-op Antibiotics  Pain control  DVT prophylaxis: Aspirin 325mg daily   OOB/Physical Therapy     Admitted on 5/17/23  Attending: Dr. Nieto   Surgery: Left TKA   Iman-op Antibiotics  Pain control  DVT prophylaxis: Aspirin 325mg daily   OOB/Physical Therapy    Commode Rx provided at discharge. Patient requires commode for post op care at home due to impaired mobility and ambulation in acute post operative period

## 2023-05-18 NOTE — DISCHARGE NOTE PROVIDER - NSDCMRMEDTOKEN_GEN_ALL_CORE_FT
acetaminophen 325 mg oral tablet: 2 tab(s) orally every 6 hours  Aspirin Enteric Coated 325 mg oral delayed release tablet: 1 tab(s) orally once a day  celecoxib 200 mg oral capsule: 1 cap(s) orally every 12 hours  furosemide 40 mg oral tablet: 1 tab(s) orally once a day, can start on 8/7/2020  gabapentin 400 mg oral capsule: 1 cap(s) orally 3 times a day  olopatadine 0.2% ophthalmic solution: 1 drop(s) to each affected eye once a day  oxyCODONE 5 mg oral tablet: 1 tab(s) orally every 4 to 6 hours as needed for  severe pain MDD: 5  polyethylene glycol 3350 oral powder for reconstitution: 17 gram(s) orally once a day  senna oral tablet: 2 tab(s) orally once a day (at bedtime), As needed, Constipation  Triamcinolone Acetonide in Absorbase 0.05% topical ointment: Apply topically to affected area 2 times a day

## 2023-05-18 NOTE — PATIENT PROFILE ADULT - FUNCTIONAL ASSESSMENT - DAILY ACTIVITY 6.
4 = No assist / stand by assistance Composite Graft Text: The defect edges were debeveled with a #15 scalpel blade.  Given the location of the defect, shape of the defect, the proximity to free margins and the fact the defect was full thickness a composite graft was deemed most appropriate.  The defect was outline and then transferred to the donor site.  A full thickness graft was then excised from the donor site. The graft was then placed in the primary defect, oriented appropriately and then sutured into place.  The secondary defect was then repaired using a primary closure.

## 2023-05-18 NOTE — PATIENT PROFILE ADULT - FUNCTIONAL ASSESSMENT - BASIC MOBILITY 6.
3-calculated by average/Not able to assess (calculate score using [facility] averaging method)

## 2023-05-18 NOTE — DISCHARGE NOTE PROVIDER - NSDCFUADDINST_GEN_ALL_CORE_FT
s/p: Left Total Knee Replacement     ACTIVITY:   - Weight bear as tolerated with assistive device. No strenuous activity, heavy lifting, driving or returning to work until cleared by MD.   - Apply a cold compress to the surgical site several times daily to reduce pain and swelling. For icing, twenty-minute sessions followed by an hour off is recommended. You should ice as frequently as possible. Ice should not be placed directly on the skin. Wearing compression stockings during the first week after surgery can help reduce swelling in your knee, calf and foot, but is not required.      (KNEE REPLACEMENTS)   DO place a pillow under your heel when elevating the leg, to encourage full extension of the knee. Do NOT place a pillow behind your knee for comfort, as this can lead to permanent difficulty straightening your leg. It is normal to develop some swelling in the leg, ankle, and foot because of gravity.     Dressing:   (DARIAN – incisional wound vac)   - You have an incisional wound vac dressing with tubing to attached canister/battery pack. You may shower but must keep battery pack dry at all times. The battery dies in 7 days then can remove dressing and leave open to air. Keep your incision clean and dry. Do not pick at your incision. Do not apply creams, ointments or oils to your incision until cleared by your surgeon. Do not soak your incision in sitting water (ie tubs, pools, lakes, etc.) until cleared by your surgeon. Do not scrub the incision – instead, allow soap and water to flow over the incision and then pat it dry with a clean towel.     MEDICATION/ANTICOAGULATION:   -You have been prescribed Aspirin, as a preventative to help prevent postoperative blood clots. Please take this medication as prescribed.    - You have been prescribed medications for pain:   - Tylenol for mild to moderate pain. Do not exceed 3,000mg daily.   - For more severe pain, take Tylenol with the addition of narcotic pain medication. Take this medication as prescribed. This medication may cause drowsiness or dizziness. Do not operate machinery. This medication may cause constipation.   - For any additional medications, follow instructions on the bottle.    -Try to have regular bowel movements. Take stool softener or laxative if necessary. You may wish to take Miralax daily until you have regular bowel movements.    - If you have been prescribed Aspirin or an anti-inflammatory, please take prilosec (omeprazole) once a day, before breakfast, until no longer taking Aspirin or anti-inflammatory. This will help protect your stomach.   - If you have a pain management physician, please follow-up with them postoperatively.    - If you experience any negative side effects of your medications, please call your surgeon's office to discuss.     FOLLOW-UP:   - Call to schedule an appt with Dr. Nieto for follow up.   - Please follow-up with your primary care physician or any other specialist you see postoperatively, if needed.    - Contact your doctor or go to the emergency room if you experience: fever greater than 101.5, chills, chest pain, difficulty breathing, redness or excessive drainage around the incision, other concerns.

## 2023-05-18 NOTE — DISCHARGE NOTE NURSING/CASE MANAGEMENT/SOCIAL WORK - NSDCPEFALRISK_GEN_ALL_CORE
For information on Fall & Injury Prevention, visit: [email or web address] OR  [email or web address] OR  https://www.cdc.gov/steadi/patient.html

## 2023-05-18 NOTE — DISCHARGE NOTE PROVIDER - CARE PROVIDER_API CALL
Pop Nieto)  Orthopaedic Surgery  [address], 10th [place] [address]  Phone: [phone]  Fax: [phone]  Follow Up Time: 2 weeks

## 2023-05-23 DIAGNOSIS — I10 ESSENTIAL (PRIMARY) HYPERTENSION: ICD-10-CM

## 2023-05-23 DIAGNOSIS — M17.12 UNILATERAL PRIMARY OSTEOARTHRITIS, LEFT KNEE: ICD-10-CM

## 2023-05-23 DIAGNOSIS — E61.1 IRON DEFICIENCY: ICD-10-CM

## 2023-05-23 DIAGNOSIS — E55.9 VITAMIN D DEFICIENCY, UNSPECIFIED: ICD-10-CM

## 2023-05-23 DIAGNOSIS — Z98.84 BARIATRIC SURGERY STATUS: ICD-10-CM

## 2023-06-02 ENCOUNTER — APPOINTMENT (OUTPATIENT)
Dept: ORTHOPEDIC SURGERY | Facility: CLINIC | Age: 58
End: 2023-06-02
Payer: OTHER MISCELLANEOUS

## 2023-06-02 PROCEDURE — 73562 X-RAY EXAM OF KNEE 3: CPT | Mod: 50

## 2023-06-02 PROCEDURE — 99024 POSTOP FOLLOW-UP VISIT: CPT

## 2023-06-02 NOTE — DISCUSSION/SUMMARY
[de-identified] : Patient is doing well she will be transition to outpatient physical therapy where she will have more intense exercise to help further strengthen the left lower extremity follow-up with me in 1 month's time.

## 2023-06-02 NOTE — HISTORY OF PRESENT ILLNESS
[de-identified] : Patient is here postop day #15 status post left knee replacement is doing quite well she is ambulating without external support except for prolonged distances out of the house patient has only had 1 or 2 in-house physical therapy visits.

## 2023-06-02 NOTE — PHYSICAL EXAM
[de-identified] : Patient is a range of motion of 0 to 125 degrees she is having some difficulty with terminal extension due to some pain but her extensor mechanism is intact.  Wound is clean and dry.  Neurovascular intact distally. [de-identified] : Knee radiographs were ordered today.  AP standing individual lateral and sunrise views were obtained showing matched bilateral Smith & Nephew journey type prosthesis in good position.

## 2023-06-30 ENCOUNTER — APPOINTMENT (OUTPATIENT)
Dept: ORTHOPEDIC SURGERY | Facility: CLINIC | Age: 58
End: 2023-06-30
Payer: OTHER MISCELLANEOUS

## 2023-06-30 PROCEDURE — 99024 POSTOP FOLLOW-UP VISIT: CPT

## 2023-06-30 RX ORDER — CELECOXIB 200 MG/1
200 CAPSULE ORAL
Qty: 30 | Refills: 2 | Status: ACTIVE | COMMUNITY
Start: 2023-06-30 | End: 1900-01-01

## 2023-06-30 NOTE — DISCUSSION/SUMMARY
[de-identified] : We recommend the patient have a Doppler to rule out a blood clot although there is a low clinical suspicion.  Patient will be placed on Celebrex 100 mg p.o. twice daily for 5-day course of reducing her discomfort and swelling.  We will review the Doppler findings once they are available.  She will be hopefully starting outpatient formal physical therapy once we get authorization.  Patient will follow-up in 1 month's time.\par \par due to calf swelling and tenderness with increasing pain, and s/p recent surgery we will be sending patient for a doppler\par patients doppler was denied by workers comp \par \par no resubmission until 7/10\par patient will be going to ER to rule out blood clot.

## 2023-06-30 NOTE — REASON FOR VISIT
[Follow-Up Visit] : a follow-up visit for [Workers' Comp: Date of Injury: _____] : This visit is related to worker's compensation. Date of Injury: [unfilled] [Artificial Knee Joint] : an artificial knee joint [FreeTextEntry2] : LEFT KNEE 5/17 POA VISIT

## 2023-06-30 NOTE — PHYSICAL EXAM
[de-identified] : Left knee range of motion is 3- 100 degrees.  There is some soreness in her calf.  Some mild swelling is noted no obvious effusion.  Neurovascular intact distally.

## 2023-06-30 NOTE — HISTORY OF PRESENT ILLNESS
[de-identified] : Patient returns today 6 weeks status post left knee replacement she has not had any formal physical therapy 2 to a lack of authorization by the Workmen's Compensation board.  Despite that patient has been doing her own exercise regimen.

## 2023-07-19 ENCOUNTER — APPOINTMENT (OUTPATIENT)
Dept: BARIATRICS | Facility: CLINIC | Age: 58
End: 2023-07-19
Payer: MEDICARE

## 2023-07-19 VITALS
BODY MASS INDEX: 39.53 KG/M2 | DIASTOLIC BLOOD PRESSURE: 76 MMHG | HEART RATE: 67 BPM | OXYGEN SATURATION: 97 % | HEIGHT: 66 IN | TEMPERATURE: 97.2 F | WEIGHT: 246 LBS | SYSTOLIC BLOOD PRESSURE: 125 MMHG

## 2023-07-19 PROCEDURE — 99213 OFFICE O/P EST LOW 20 MIN: CPT

## 2023-07-19 PROCEDURE — 99203 OFFICE O/P NEW LOW 30 MIN: CPT

## 2023-07-19 NOTE — END OF VISIT
[FreeTextEntry3] : All medical record entries made by the Scribe were at my, ZAHIRA Amor , direction and personally dictated by me on 07/19/2023 . I have reviewed the chart and agree that the record accurately reflects my personal performance of the history, physical exam, assessment and plan. I have also personally directed, reviewed, and agreed with the chart.\par

## 2023-07-19 NOTE — ADDENDUM
[FreeTextEntry1] : Documented by Miguel Johnson acting as a scribe for ZAHIRA Amor on 07/19/2023\par

## 2023-07-19 NOTE — ASSESSMENT
[FreeTextEntry1] :  comes to see us today. She is a 57 year old F s/p VSG with recidivism in 8/5/2020 w/  and s/ total left knee replacement who ambulates with a cane. States her diet consists of vegetables but admits to eating unhealthy junk food . Sleeps on two pillows and states she feels pressure in her throat if she eats close to the bed time. Denies n,c,d, abd pain and acid reflux. Importance of lifestyle modification with a healthy diet and physical activity emphasized. Patient understands the need to maintain a healthy lifestyle. At this time, will refer her to medical weight loss and will have her see our nutritionist for diet counseling. Will have her return for follow up if medical weight loss is not successful for her to lose weight. \par

## 2023-07-19 NOTE — HISTORY OF PRESENT ILLNESS
[de-identified] :  comes to see us today. She is a 57 year old F s/p VSG with recidivism in 8/5/2020 w/  and s/ total left knee replacement who ambulates with a cane. States her diet consists of vegetables but admits to eating unhealthy junk food . Sleeps on two pillows and states she feels pressure in her throat if she eats close to the bed time. Denies n,c,d, abd pain and acid reflux. Importance of lifestyle modification with a healthy diet and physical activity emphasized. Patient understands the need to maintain a healthy lifestyle. At this time, will refer her to medical weight loss and will have her see our nutritionist for diet counseling. Will have her return for follow up if medical weight loss is not successful for her to lose weight. \par

## 2023-07-19 NOTE — PLAN
[FreeTextEntry1] : Have refer her to medical weight loss and will have her see our nutritionist for diet counseling. Will have her return for follow up if medical weight loss is not successful for her to lose weight and discuss any necessary surgical intervention. \par

## 2023-07-20 PROCEDURE — 73560 X-RAY EXAM OF KNEE 1 OR 2: CPT

## 2023-07-20 PROCEDURE — 97161 PT EVAL LOW COMPLEX 20 MIN: CPT

## 2023-07-20 PROCEDURE — 85027 COMPLETE CBC AUTOMATED: CPT

## 2023-07-20 PROCEDURE — 36415 COLL VENOUS BLD VENIPUNCTURE: CPT

## 2023-07-20 PROCEDURE — 97530 THERAPEUTIC ACTIVITIES: CPT

## 2023-07-20 PROCEDURE — C1713: CPT

## 2023-07-20 PROCEDURE — C1776: CPT

## 2023-07-20 PROCEDURE — 97116 GAIT TRAINING THERAPY: CPT

## 2023-07-20 PROCEDURE — 80048 BASIC METABOLIC PNL TOTAL CA: CPT

## 2023-08-07 ENCOUNTER — APPOINTMENT (OUTPATIENT)
Dept: ORTHOPEDIC SURGERY | Facility: CLINIC | Age: 58
End: 2023-08-07
Payer: OTHER MISCELLANEOUS

## 2023-08-07 PROCEDURE — 99024 POSTOP FOLLOW-UP VISIT: CPT

## 2023-08-31 ENCOUNTER — APPOINTMENT (OUTPATIENT)
Dept: ENDOCRINOLOGY | Facility: CLINIC | Age: 58
End: 2023-08-31
Payer: MEDICARE

## 2023-08-31 ENCOUNTER — NON-APPOINTMENT (OUTPATIENT)
Age: 58
End: 2023-08-31

## 2023-08-31 VITALS
WEIGHT: 258 LBS | HEART RATE: 63 BPM | TEMPERATURE: 97.9 F | SYSTOLIC BLOOD PRESSURE: 126 MMHG | OXYGEN SATURATION: 96 % | DIASTOLIC BLOOD PRESSURE: 77 MMHG | BODY MASS INDEX: 41.46 KG/M2 | HEIGHT: 66 IN

## 2023-08-31 DIAGNOSIS — Z83.3 FAMILY HISTORY OF DIABETES MELLITUS: ICD-10-CM

## 2023-08-31 DIAGNOSIS — Z83.79 FAMILY HISTORY OF OTHER DISEASES OF THE DIGESTIVE SYSTEM: ICD-10-CM

## 2023-08-31 DIAGNOSIS — Z86.69 PERSONAL HISTORY OF OTHER DISEASES OF THE NERVOUS SYSTEM AND SENSE ORGANS: ICD-10-CM

## 2023-08-31 LAB — HBA1C MFR BLD HPLC: 5.9

## 2023-08-31 PROCEDURE — 83036 HEMOGLOBIN GLYCOSYLATED A1C: CPT | Mod: QW

## 2023-08-31 PROCEDURE — 99205 OFFICE O/P NEW HI 60 MIN: CPT | Mod: 25

## 2023-08-31 RX ORDER — PREDNISONE 10 MG/1
10 TABLET ORAL
Qty: 21 | Refills: 0 | Status: DISCONTINUED | COMMUNITY
Start: 2021-07-26 | End: 2023-08-31

## 2023-08-31 RX ORDER — METRONIDAZOLE 250 MG/1
250 TABLET ORAL EVERY 6 HOURS
Qty: 56 | Refills: 0 | Status: DISCONTINUED | COMMUNITY
Start: 2019-10-10 | End: 2023-08-31

## 2023-08-31 RX ORDER — IBUPROFEN 600 MG/1
600 TABLET, FILM COATED ORAL
Qty: 21 | Refills: 0 | Status: DISCONTINUED | COMMUNITY
Start: 2022-12-06 | End: 2023-08-31

## 2023-08-31 RX ORDER — FLUOCINONIDE 0.5 MG/ML
0.05 SOLUTION TOPICAL
Qty: 60 | Refills: 0 | Status: DISCONTINUED | COMMUNITY
Start: 2021-09-30 | End: 2023-08-31

## 2023-08-31 RX ORDER — ASCORBIC ACID 500 MG
TABLET ORAL
Refills: 0 | Status: ACTIVE | COMMUNITY

## 2023-08-31 RX ORDER — OXYCODONE HYDROCHLORIDE 10 MG/1
10 TABLET, FILM COATED, EXTENDED RELEASE ORAL
Qty: 20 | Refills: 0 | Status: DISCONTINUED | COMMUNITY
Start: 2020-08-28 | End: 2023-08-31

## 2023-08-31 RX ORDER — KETOTIFEN FUMARATE 0.25 MG/ML
0.03 SOLUTION/ DROPS OPHTHALMIC
Qty: 5 | Refills: 0 | Status: DISCONTINUED | COMMUNITY
Start: 2022-12-08 | End: 2023-08-31

## 2023-08-31 RX ORDER — VITAMIN E
10000 OIL (ML) TOPICAL
Refills: 0 | Status: ACTIVE | COMMUNITY

## 2023-08-31 RX ORDER — OXYCODONE AND ACETAMINOPHEN 7.5; 325 MG/1; MG/1
7.5-325 TABLET ORAL
Qty: 40 | Refills: 0 | Status: DISCONTINUED | COMMUNITY
Start: 2020-08-14 | End: 2023-08-31

## 2023-08-31 RX ORDER — LORATADINE 10 MG/1
10 TABLET ORAL
Qty: 30 | Refills: 0 | Status: DISCONTINUED | COMMUNITY
Start: 2022-12-08 | End: 2023-08-31

## 2023-08-31 RX ORDER — FLUCONAZOLE 150 MG/1
150 TABLET ORAL
Qty: 3 | Refills: 0 | Status: DISCONTINUED | COMMUNITY
Start: 2019-05-22 | End: 2023-08-31

## 2023-08-31 RX ORDER — OXYCODONE AND ACETAMINOPHEN 5; 325 MG/1; MG/1
5-325 TABLET ORAL
Qty: 30 | Refills: 0 | Status: DISCONTINUED | COMMUNITY
Start: 2023-05-23 | End: 2023-08-31

## 2023-08-31 RX ORDER — OXYCODONE 5 MG/1
5 TABLET ORAL
Qty: 20 | Refills: 0 | Status: DISCONTINUED | COMMUNITY
Start: 2023-06-02 | End: 2023-08-31

## 2023-08-31 RX ORDER — OXYCODONE AND ACETAMINOPHEN 7.5; 325 MG/1; MG/1
7.5-325 TABLET ORAL
Qty: 40 | Refills: 0 | Status: DISCONTINUED | COMMUNITY
Start: 2020-09-04 | End: 2023-08-31

## 2023-08-31 RX ORDER — FLUCONAZOLE 100 MG/1
100 TABLET ORAL
Qty: 2 | Refills: 0 | Status: DISCONTINUED | COMMUNITY
Start: 2022-10-18 | End: 2023-08-31

## 2023-08-31 RX ORDER — FLUTICASONE PROPIONATE 50 UG/1
50 SPRAY, METERED NASAL
Qty: 16 | Refills: 0 | Status: DISCONTINUED | COMMUNITY
Start: 2022-06-21 | End: 2023-08-31

## 2023-08-31 RX ORDER — CEPHALEXIN 500 MG/1
500 CAPSULE ORAL
Qty: 21 | Refills: 0 | Status: DISCONTINUED | COMMUNITY
Start: 2022-06-21 | End: 2023-08-31

## 2023-08-31 RX ORDER — CLARITHROMYCIN 500 MG/1
500 TABLET, FILM COATED ORAL TWICE DAILY
Qty: 28 | Refills: 0 | Status: DISCONTINUED | COMMUNITY
Start: 2019-05-22 | End: 2023-08-31

## 2023-08-31 RX ORDER — AMOXICILLIN 500 MG/1
500 TABLET, FILM COATED ORAL TWICE DAILY
Qty: 56 | Refills: 0 | Status: DISCONTINUED | COMMUNITY
Start: 2019-05-22 | End: 2023-08-31

## 2023-08-31 RX ORDER — METRONIDAZOLE 500 MG/1
500 TABLET ORAL
Qty: 14 | Refills: 0 | Status: DISCONTINUED | COMMUNITY
Start: 2021-08-09 | End: 2023-08-31

## 2023-08-31 RX ORDER — TETRACYCLINE HYDROCHLORIDE 250 MG/1
250 CAPSULE ORAL 4 TIMES DAILY
Qty: 56 | Refills: 0 | Status: DISCONTINUED | COMMUNITY
Start: 2019-10-10 | End: 2023-08-31

## 2023-08-31 RX ORDER — OXYCODONE 5 MG/1
5 TABLET ORAL EVERY 6 HOURS
Qty: 20 | Refills: 0 | Status: DISCONTINUED | COMMUNITY
Start: 2023-05-25 | End: 2023-08-31

## 2023-08-31 RX ORDER — OXYCODONE AND ACETAMINOPHEN 5; 325 MG/1; MG/1
5-325 TABLET ORAL
Qty: 30 | Refills: 0 | Status: DISCONTINUED | COMMUNITY
Start: 2023-05-22 | End: 2023-08-31

## 2023-08-31 RX ORDER — AMOXICILLIN 500 MG/1
500 CAPSULE ORAL
Qty: 21 | Refills: 0 | Status: DISCONTINUED | COMMUNITY
Start: 2022-12-06 | End: 2023-08-31

## 2023-08-31 RX ORDER — PNV NO.95/FERROUS FUM/FOLIC AC 28MG-0.8MG
TABLET ORAL
Refills: 0 | Status: ACTIVE | COMMUNITY

## 2023-08-31 RX ORDER — ERGOCALCIFEROL 1.25 MG/1
1.25 MG CAPSULE, LIQUID FILLED ORAL
Qty: 12 | Refills: 0 | Status: ACTIVE | COMMUNITY
Start: 2019-05-22

## 2023-08-31 RX ORDER — OXYCODONE HYDROCHLORIDE 10 MG/1
10 TABLET, FILM COATED, EXTENDED RELEASE ORAL
Qty: 30 | Refills: 0 | Status: DISCONTINUED | COMMUNITY
Start: 2020-08-27 | End: 2023-08-31

## 2023-08-31 RX ORDER — BISMUTH SUBSALICYLATE 262 MG/1
262 TABLET, CHEWABLE ORAL 4 TIMES DAILY
Qty: 56 | Refills: 0 | Status: DISCONTINUED | COMMUNITY
Start: 2019-10-10 | End: 2023-08-31

## 2023-08-31 RX ORDER — MULTIVITAMIN
TABLET ORAL
Refills: 0 | Status: ACTIVE | COMMUNITY

## 2023-08-31 RX ORDER — METHYLPREDNISOLONE 4 MG/1
4 TABLET ORAL
Qty: 1 | Refills: 1 | Status: DISCONTINUED | COMMUNITY
Start: 2023-08-07 | End: 2023-08-31

## 2023-08-31 RX ORDER — PANTOPRAZOLE 40 MG/1
40 TABLET, DELAYED RELEASE ORAL DAILY
Qty: 30 | Refills: 1 | Status: DISCONTINUED | COMMUNITY
Start: 2020-02-14 | End: 2023-08-31

## 2023-08-31 RX ORDER — KETOCONAZOLE 20.5 MG/ML
2 SHAMPOO, SUSPENSION TOPICAL
Qty: 120 | Refills: 0 | Status: DISCONTINUED | COMMUNITY
Start: 2021-05-20 | End: 2023-08-31

## 2023-08-31 RX ORDER — OXYCODONE AND ACETAMINOPHEN 5; 325 MG/1; MG/1
5-325 TABLET ORAL EVERY 6 HOURS
Qty: 30 | Refills: 0 | Status: DISCONTINUED | COMMUNITY
Start: 2020-12-01 | End: 2023-08-31

## 2023-08-31 RX ORDER — OMEPRAZOLE 20 MG/1
20 CAPSULE, DELAYED RELEASE ORAL TWICE DAILY
Qty: 28 | Refills: 2 | Status: DISCONTINUED | COMMUNITY
Start: 2019-10-10 | End: 2023-08-31

## 2023-08-31 NOTE — HISTORY OF PRESENT ILLNESS
[6] : a current pain level of 6/10 [Walking] : walking [Standing] : standing [Knee Flexion] : worsened with knee flexion [Knee Extension] : worsened with knee extension [de-identified] : patient is s/p left knee replacement surgery on 5/17. Patient is not active in PT due to pending authorization from workers comp. Patient needs to be able to participate in physical therapy to avoid stiffness in the knee after replacement surgery.    Pain levels include a current pain level of 6/10.  Her symptoms occur while walking and standing.    Modifying factors - worsened with knee flexion and worsened with knee extension.

## 2023-08-31 NOTE — REVIEW OF SYSTEMS
[Recent Weight Gain (___ Lbs)] : recent weight gain: [unfilled] lbs [Joint Pain] : joint pain [Fatigue] : no fatigue [Chest Pain] : no chest pain [Palpitations] : no palpitations [Nausea] : no nausea [Vomiting] : no vomiting [Headaches] : no headaches [Tremors] : no tremors

## 2023-08-31 NOTE — REASON FOR VISIT
[Post-Operative Visit] : a post-operative visit for [Artificial Knee Joint] : an artificial knee joint [Total Knee Replacement Surgery, Aftercare] : total knee replacement surgery, aftercare [FreeTextEntry2] : JACQUELINE GIL is a 57 year old female being seen for a post-operative visit for an artificial knee joint and total knee replacement surgery, aftercare. LEFT REPLACED 5/17 POA VISIT. STILL HAVING SOME CLICKING AND PAIN IN THE CALF AREA.

## 2023-08-31 NOTE — PHYSICAL EXAM
[Alert] : alert [No Acute Distress] : no acute distress [EOMI] : extra ocular movement intact [No Respiratory Distress] : no respiratory distress [No Accessory Muscle Use] : no accessory muscle use [Clear to Auscultation] : lungs were clear to auscultation bilaterally [Normal S1, S2] : normal S1 and S2 [Normal Rate] : heart rate was normal [Normal Bowel Sounds] : normal bowel sounds [Not Tender] : non-tender [Soft] : abdomen soft [Normal Affect] : the affect was normal [Normal Insight/Judgement] : insight and judgment were intact [de-identified] : BMI 41 [de-identified] : ambulates with cane assistance

## 2023-08-31 NOTE — DISCUSSION/SUMMARY
[de-identified] : Patient was encouraged to continue with exercise regimen to help improve the strength of her left lower extremity she will follow-up with me in 6 weeks time if she is minimal symptoms at that point she will instead follow-up with 1 year anniversary from her index procedure.   patient is doing well but needs to continue physical therapy in order to have a proper outcome of this surgery. We will submit for PT script.  I, Dr Niteo personally performed the evaluation and management services for this established patient who presents today with (a) new problem(s)/exacerbation of (an) existing condition(s). That E/M includes conducting the clinically appropriate interval history &/or exam, assessing all new/exacerbated conditions, and establishing a new plan of care. Today, my MIGUEL,Yenny Barker was here to observe my evaluation and management service for this new problem/exacerbated condition and follow the plan of care established by me going forward.  visit lasted 20 min.

## 2023-08-31 NOTE — HISTORY OF PRESENT ILLNESS
[6] : a current pain level of 6/10 [Walking] : walking [Standing] : standing [Knee Flexion] : worsened with knee flexion [Knee Extension] : worsened with knee extension [de-identified] : patient is s/p left knee replacement surgery on 5/17. Patient is not active in PT due to pending authorization from workers comp. Patient needs to be able to participate in physical therapy to avoid stiffness in the knee after replacement surgery.    Pain levels include a current pain level of 6/10.  Her symptoms occur while walking and standing.    Modifying factors - worsened with knee flexion and worsened with knee extension.

## 2023-08-31 NOTE — ASSESSMENT
[Weight Loss] : weight loss [FreeTextEntry1] : Patient is a 57 yo woman with class III obesity s/p VSG here for medical weight management consultation  1. Class III Obesity/BMI 41 Patient states struggles with weight loss over a long period of time. Comorbid conditions include arthritis and prediabetes.  She had VSG in 2020 with 50 lbs weight loss with regain. -significant time spent reconciling the patient's medicines.  Weight unfavorable Rx include gabapentin and levocetirizine.  Patient received steroid recently for arthritic pain.  When medicines are prescribed, providers should consider weight effects.   -recommend nutritional counseling -24 hour food recall reviewed: diet fairly healthy, minimal outside foods. There is excess sugars like candy.  Physically inactive due to arthritis pain -medical management with GLP 1 agonist discussed. Explicitly educated about risks including pancreatitis. States her father had it from gallstones.  Denies MEN/MTC. GI side effects discussed.   -Rx for semaglutide sent to pharmacy.  Informed that medicare will not cover AOMs however, her union insurance might.  If medicines are not covered, she will have to consider revision  2. Prediabetes -POCT A1c today is 5.9%  Follow up based in 3 months

## 2023-08-31 NOTE — PHYSICAL EXAM
[de-identified] : Left knee range of motion is 0-125 degrees. There is some soreness in her calf. Some mild swelling is noted no obvious effusion. Neurovascular intact distally.There is no obvious instability noted both in full extension and 9 degrees of flexion. Swelling is appropriate.

## 2023-08-31 NOTE — PHYSICAL EXAM
[de-identified] : Left knee range of motion is 0-125 degrees. There is some soreness in her calf. Some mild swelling is noted no obvious effusion. Neurovascular intact distally.There is no obvious instability noted both in full extension and 9 degrees of flexion. Swelling is appropriate.

## 2023-08-31 NOTE — HISTORY OF PRESENT ILLNESS
[FreeTextEntry1] : Patient is a 59 yo woman referred for weight management.  Patient was small and stopped smoking 20 years ago.  The weight gain began after that around the age of 46 years.  States she went from a size 6-8 then it went to 10.  In 2007, her size increased to size 12-14.  Size peaked at 14.  Patient cut out sugars, increased exercise/walking.  Dr. Levy her PCP recommended a 2500 calorie diet in 2018.  Her problem is "junk food."   Potato chips were her main issue.  Weighed food, counted calories and cut out chips.  She lost 20 lbs.  Patient got hurt at work so she couldn't work (previously worked in maintenance).  She was sitting at her desk and was more sedentary and had weight regain.  Then she started to snack on chips again.  She had a VSG with Dr. Stewart in 2020 with recidivism.  Comorbid conditions including arthritis, prediabetes and sleep apnea.  Patient was told she could lose 100 lbs or more.  After her sleeve gastrectomy. she had right knee surgery.  Going into surgery, she was 270 lbs then lowest weight was 219.    Weight slowly came back. Sister was 300 lbs and was able to get down to a size four 24 hour food recall Breakfast: coffee with coffee mate creamer; 2 splendas Lunch: fruit (cherries) Dinner:  patient had sauteed spinach, tofu and broccoli Snacks: veggie straw portion controlled No soda and no juice Sweet tooth: peppermint candy daily, wintergreen lifesavers 1-3 per day; chews gum Physical activity: tries to do a little walking but that's about it Purchases outside food 1-2 times a month.  Even when getting outside foods, she "doesn't really eat."  Diet is mostly vegetables.   Both sisters with overweight Medrol dose gunnar recently for knee pain Gabapentin: sometimes it helps but sometimes it doesn't Father: hx of pancreatitis but not pancreatic cancer

## 2023-08-31 NOTE — DISCUSSION/SUMMARY
[de-identified] : Patient was encouraged to continue with exercise regimen to help improve the strength of her left lower extremity she will follow-up with me in 6 weeks time if she is minimal symptoms at that point she will instead follow-up with 1 year anniversary from her index procedure.   patient is doing well but needs to continue physical therapy in order to have a proper outcome of this surgery. We will submit for PT script.  I, Dr Nieto personally performed the evaluation and management services for this established patient who presents today with (a) new problem(s)/exacerbation of (an) existing condition(s). That E/M includes conducting the clinically appropriate interval history &/or exam, assessing all new/exacerbated conditions, and establishing a new plan of care. Today, my MIGUEL,Yenny Barker was here to observe my evaluation and management service for this new problem/exacerbated condition and follow the plan of care established by me going forward.  visit lasted 20 min.

## 2023-08-31 NOTE — CONSULT LETTER
[Dear  ___] : Dear  [unfilled], [Consult Letter:] : I had the pleasure of evaluating your patient, [unfilled]. [Please see my note below.] : Please see my note below. [Consult Closing:] : Thank you very much for allowing me to participate in the care of this patient.  If you have any questions, please do not hesitate to contact me. [Sincerely,] : Sincerely, [FreeTextEntry3] : Ritu Starkey MD

## 2023-09-18 ENCOUNTER — APPOINTMENT (OUTPATIENT)
Dept: ORTHOPEDIC SURGERY | Facility: CLINIC | Age: 58
End: 2023-09-18
Payer: OTHER MISCELLANEOUS

## 2023-09-18 DIAGNOSIS — M25.561 PAIN IN RIGHT KNEE: ICD-10-CM

## 2023-09-18 DIAGNOSIS — M25.562 PAIN IN RIGHT KNEE: ICD-10-CM

## 2023-09-18 PROCEDURE — 99214 OFFICE O/P EST MOD 30 MIN: CPT

## 2023-09-18 PROCEDURE — 73562 X-RAY EXAM OF KNEE 3: CPT | Mod: 50

## 2023-09-18 RX ORDER — CELECOXIB 200 MG/1
200 CAPSULE ORAL
Qty: 24 | Refills: 1 | Status: ACTIVE | COMMUNITY
Start: 2023-09-18 | End: 1900-01-01

## 2023-10-03 NOTE — PATIENT PROFILE ADULT - NSPROCHRONICPAINRELIEVE_GEN_A_NUR
cold/medications/immobilization/distraction Patient/Caregiver requests family/friend to interpret. medications/cold/heat/immobilization/rest relaxation/medications/cold/heat/rest/immobilization/repositioning

## 2023-10-20 ENCOUNTER — APPOINTMENT (OUTPATIENT)
Dept: ORTHOPEDIC SURGERY | Facility: CLINIC | Age: 58
End: 2023-10-20
Payer: OTHER MISCELLANEOUS

## 2023-10-20 PROCEDURE — 99212 OFFICE O/P EST SF 10 MIN: CPT

## 2023-12-11 ENCOUNTER — APPOINTMENT (OUTPATIENT)
Dept: ENDOCRINOLOGY | Facility: CLINIC | Age: 58
End: 2023-12-11

## 2023-12-22 ENCOUNTER — APPOINTMENT (OUTPATIENT)
Dept: ORTHOPEDIC SURGERY | Facility: CLINIC | Age: 58
End: 2023-12-22
Payer: OTHER MISCELLANEOUS

## 2023-12-22 DIAGNOSIS — M17.12 UNILATERAL PRIMARY OSTEOARTHRITIS, LEFT KNEE: ICD-10-CM

## 2023-12-22 PROCEDURE — 99214 OFFICE O/P EST MOD 30 MIN: CPT

## 2023-12-22 NOTE — REASON FOR VISIT
[Follow-Up Visit] : a follow-up visit for [Workers' Comp: Date of Injury: _____] : This visit is related to worker's compensation. Date of Injury: [unfilled] [Artificial Knee Joint] : an artificial knee joint [Knee Pain] : knee pain [FreeTextEntry2] :  VISIT. HISTORY OF LEFT KNEE REPLACED.

## 2023-12-22 NOTE — PHYSICAL EXAM
[de-identified] : Left knee range of motion is 0-125 degrees.  There is some soreness in her calf.  Some mild swelling is noted no obvious effusion.  Neurovascular intact distally.There is no obvious instability noted both in full extension and 9 degrees of flexion.  Swelling is appropriate.There is some mild tenderness to palpation of the anterolateral patellofemoral facet.

## 2023-12-22 NOTE — HISTORY OF PRESENT ILLNESS
[de-identified] : Patient returns today with complaint of left knee buckling and pain.  She has complained about this in the past we told her in the past that she needs to improve her quad strength today with continued physical therapy and exercises at home.  She is here to have it read evaluated she has not had any nishant falls or any episodes of nishant instability and is not really painful but just she notices a clicking sensation in the knee.

## 2023-12-22 NOTE — DISCUSSION/SUMMARY
[de-identified] : Patient I talked about her options she will resume her home exercise regimen to improve her quadriceps tone she will also be placed in a Genutrain brace to be worn out of the house for increased sense of stability and safety.  We also talked about potentially to consider a poly exchange of the patient continues to feel this AP instability which is very slight but however it causes her anxiety and because there is episodes of buckling we may eventually want to increase the poly thickness and will exchange to a constrained poly.  I told the patient that is probably very unlikely to need to do this but it is 1 option option comes very uncomfortable with the knee.  This consultation lasted 30 minutes.

## 2024-01-16 NOTE — PATIENT PROFILE ADULT - NSPROGENSOURCEINFO_GEN_A_NUR
Forms/Letter Request    Type of form/letter:  verbal request  for home care    Have you been seen for this request: No    Do we have the form/letter:VERBAL     When is form/letter needed by: ASAP    How would you like the form/letter returned:  VERBAL RETURN CALL WITH VERBAL ORDER LEFT ON VOICEMAIL is ok    Patient Notified form requests are processed in 3-5 business days:Yes    Could we send this information to you in [facility] or would you prefer to receive a phone call?:   Patient would prefer a phone call   Okay to leave a detailed message?: Yes at Other phone number:  [phone]* [facility]    patient

## 2024-03-27 ENCOUNTER — APPOINTMENT (OUTPATIENT)
Dept: ENDOCRINOLOGY | Facility: CLINIC | Age: 59
End: 2024-03-27
Payer: MEDICARE

## 2024-03-27 VITALS
SYSTOLIC BLOOD PRESSURE: 137 MMHG | TEMPERATURE: 97.3 F | WEIGHT: 269 LBS | BODY MASS INDEX: 43.23 KG/M2 | OXYGEN SATURATION: 97 % | HEIGHT: 66 IN | HEART RATE: 80 BPM | DIASTOLIC BLOOD PRESSURE: 78 MMHG

## 2024-03-27 DIAGNOSIS — E66.01 MORBID (SEVERE) OBESITY DUE TO EXCESS CALORIES: ICD-10-CM

## 2024-03-27 PROCEDURE — 99214 OFFICE O/P EST MOD 30 MIN: CPT

## 2024-03-27 PROCEDURE — G2211 COMPLEX E/M VISIT ADD ON: CPT

## 2024-03-27 NOTE — ASSESSMENT
[Weight Loss] : weight loss [FreeTextEntry1] : Patient is a 59 yo woman with class III obesity s/p VSG here for medical weight management consultation  1. Class III Obesity/BMI 41 Patient states struggles with weight loss over a long period of time. Comorbid conditions include arthritis and prediabetes. She had VSG in 2020 with 50 lbs weight loss with regain. -at her consultation visit, Wegovy was prescribed. However, due to the national shortage, she has not been able to obtain the medicines. Further, she's unclear as to whether it is covered by insurance.  I've asked her to call retail pharmacies to see which one has stock available.  If stock is available, I will send a Rx. She is aware that medicare does not cover this medicine but her union benefits may.  Patient to ask pharmacy to run through medicine via [facility] pharmacy benefits -recommend nutritional counseling -24 hour food recall reviewed: diet fairly healthy, minimal outside foods. There is excess sugars like candy. Physically inactive due to arthritis pain -ultimately, if the patient is unable to get medications, recommend surgical follow up

## 2024-03-27 NOTE — PHYSICAL EXAM
[Alert] : alert [No Acute Distress] : no acute distress [No Accessory Muscle Use] : no accessory muscle use [No Respiratory Distress] : no respiratory distress [Clear to Auscultation] : lungs were clear to auscultation bilaterally [Normal Rate] : heart rate was normal [Normal Bowel Sounds] : normal bowel sounds [Not Tender] : non-tender [Soft] : abdomen soft [Normal Gait] : normal gait [Normal Affect] : the affect was normal [Normal Mood] : the mood was normal [de-identified] : BMI 43

## 2024-03-27 NOTE — HISTORY OF PRESENT ILLNESS
[FreeTextEntry1] : Patient is a 57 yo woman following up for weight management.  Patient was small and stopped smoking 20 years ago. The weight gain began after that around the age of 46 years. States she went from a size 6-8 then it went to 10. In 2007, her size increased to size 12-14. Size peaked at 14. Patient cut out sugars, increased exercise/walking. Dr. Levy her PCP recommended a 2500 calorie diet in 2018. Her problem is "junk food."  She had a VSG with Dr. Stewart in 2020 with recidivism. Comorbid conditions including arthritis, prediabetes and sleep apnea. Patient was told she could lose 100 lbs or more. After her sleeve gastrectomy. she had right knee surgery. Going into surgery, she was 270 lbs then lowest weight was 219. Weight slowly came back. Patient came for consultation August 2023 at which point Wegovy was prescribed but she never filled it because  24 hour food recall Breakfast: coffee with powder carnation or no sugar hazelnut creamer; protein shake Lunch: fruit (cherries); does not really eat lunch but will have avocado or tomato Dinner: sauteed vegetables. Last night, she had salmon, sauteed vegetables and two spoonfuls of mashed potatoes Snacks: veggie chips and sugar free ice pops No soda and no juice Sweet tooth: peppermint candy daily, wintergreen lifesavers 1-3 per day; chews gum Physical activity: tries to do a little walking but that's about it

## 2024-03-27 NOTE — REVIEW OF SYSTEMS
[Fatigue] : no fatigue [Decreased Appetite] : appetite not decreased [Dysphagia] : no dysphagia [Dysphonia] : no dysphonia [Chest Pain] : no chest pain [Slow Heart Rate] : heart rate is not slow [Palpitations] : no palpitations [Fast Heart Rate] : heart rate is not fast [Nausea] : no nausea [Constipation] : no constipation [Vomiting] : no vomiting [Diarrhea] : no diarrhea [Depression] : no depression

## 2024-04-01 ENCOUNTER — APPOINTMENT (OUTPATIENT)
Dept: ORTHOPEDIC SURGERY | Facility: CLINIC | Age: 59
End: 2024-04-01

## 2024-05-10 RX ORDER — SEMAGLUTIDE 0.25 MG/.5ML
0.25 INJECTION, SOLUTION SUBCUTANEOUS
Qty: 1 | Refills: 3 | Status: ACTIVE | COMMUNITY
Start: 2023-08-31 | End: 1900-01-01

## 2024-06-11 ENCOUNTER — APPOINTMENT (OUTPATIENT)
Dept: ENDOCRINOLOGY | Facility: CLINIC | Age: 59
End: 2024-06-11
Payer: MEDICARE

## 2024-06-11 VITALS
SYSTOLIC BLOOD PRESSURE: 126 MMHG | DIASTOLIC BLOOD PRESSURE: 79 MMHG | WEIGHT: 272 LBS | HEIGHT: 66 IN | HEART RATE: 80 BPM | BODY MASS INDEX: 43.71 KG/M2

## 2024-06-11 DIAGNOSIS — R73.03 PREDIABETES.: ICD-10-CM

## 2024-06-11 DIAGNOSIS — E66.01 MORBID (SEVERE) OBESITY DUE TO EXCESS CALORIES: ICD-10-CM

## 2024-06-11 PROCEDURE — G2211 COMPLEX E/M VISIT ADD ON: CPT

## 2024-06-11 PROCEDURE — 99214 OFFICE O/P EST MOD 30 MIN: CPT

## 2024-07-15 ENCOUNTER — NON-APPOINTMENT (OUTPATIENT)
Age: 59
End: 2024-07-15

## 2024-07-16 RX ORDER — METFORMIN ER 500 MG 500 MG/1
500 TABLET ORAL
Qty: 180 | Refills: 1 | Status: ACTIVE | COMMUNITY
Start: 2024-07-16 | End: 1900-01-01

## 2024-09-03 ENCOUNTER — APPOINTMENT (OUTPATIENT)
Dept: ENDOCRINOLOGY | Facility: CLINIC | Age: 59
End: 2024-09-03

## 2024-11-07 ENCOUNTER — APPOINTMENT (OUTPATIENT)
Dept: VASCULAR SURGERY | Facility: CLINIC | Age: 59
End: 2024-11-07
Payer: MEDICARE

## 2024-11-07 VITALS — HEIGHT: 66 IN | BODY MASS INDEX: 42.43 KG/M2 | WEIGHT: 264 LBS

## 2024-11-07 DIAGNOSIS — R60.0 LOCALIZED EDEMA: ICD-10-CM

## 2024-11-07 PROCEDURE — 99204 OFFICE O/P NEW MOD 45 MIN: CPT

## 2024-11-07 PROCEDURE — 99214 OFFICE O/P EST MOD 30 MIN: CPT

## 2024-11-07 PROCEDURE — 93970 EXTREMITY STUDY: CPT

## 2024-11-08 PROBLEM — R60.0 BILATERAL EDEMA OF LOWER EXTREMITY: Status: ACTIVE | Noted: 2024-11-08

## 2024-11-15 ENCOUNTER — APPOINTMENT (OUTPATIENT)
Dept: ENDOCRINOLOGY | Facility: CLINIC | Age: 59
End: 2024-11-15

## 2024-11-15 DIAGNOSIS — E66.01 MORBID (SEVERE) OBESITY DUE TO EXCESS CALORIES: ICD-10-CM

## 2024-11-15 DIAGNOSIS — R73.03 PREDIABETES.: ICD-10-CM

## 2024-11-15 PROCEDURE — G2211 COMPLEX E/M VISIT ADD ON: CPT

## 2024-11-15 PROCEDURE — 99214 OFFICE O/P EST MOD 30 MIN: CPT

## 2024-11-15 RX ORDER — PHENTERMINE HYDROCHLORIDE 15 MG/1
15 CAPSULE ORAL
Qty: 30 | Refills: 0 | Status: ACTIVE | COMMUNITY
Start: 2024-11-15 | End: 1900-01-01

## 2025-03-25 ENCOUNTER — RX RENEWAL (OUTPATIENT)
Age: 60
End: 2025-03-25

## 2025-07-07 ENCOUNTER — APPOINTMENT (OUTPATIENT)
Dept: ORTHOPEDIC SURGERY | Facility: CLINIC | Age: 60
End: 2025-07-07
Payer: OTHER MISCELLANEOUS

## 2025-07-07 PROBLEM — M17.0 ARTHRITIS OF BOTH KNEES: Status: ACTIVE | Noted: 2025-07-07

## 2025-07-07 PROCEDURE — 73562 X-RAY EXAM OF KNEE 3: CPT | Mod: 50

## 2025-07-07 PROCEDURE — 99214 OFFICE O/P EST MOD 30 MIN: CPT

## 2025-07-07 RX ORDER — CELECOXIB 200 MG/1
200 CAPSULE ORAL
Qty: 90 | Refills: 1 | Status: ACTIVE | COMMUNITY
Start: 2025-07-07 | End: 1900-01-01

## (undated) DEVICE — STRYKER 4-PORT MANIFOLD W/SPECIMEN COLLECTION

## (undated) DEVICE — DRSG PICO NPWT 4X12"

## (undated) DEVICE — SAW BLADE STRYKER SAGITTAL DUAL CUT TEETH 35X64X.64MM

## (undated) DEVICE — SAW BLADE STRYKER SAGITTAL 25X86.5X1.32MM

## (undated) DEVICE — PACK TOTAL KNEE

## (undated) DEVICE — VENODYNE/SCD SLEEVE CALF MEDIUM

## (undated) DEVICE — POSITIONER FOAM EGG CRATE ULNAR 2PCS (PINK)

## (undated) DEVICE — SUT VICRYL 2-0 27" CT-1

## (undated) DEVICE — SUT STRATAFIX SYMMETRIC PDS 1 45CM OS-6

## (undated) DEVICE — DRSG COBAN 6"

## (undated) DEVICE — WARMING BLANKET UPPER ADULT

## (undated) DEVICE — MARKING PEN W RULER